# Patient Record
Sex: FEMALE | Race: WHITE | Employment: UNEMPLOYED | URBAN - METROPOLITAN AREA
[De-identification: names, ages, dates, MRNs, and addresses within clinical notes are randomized per-mention and may not be internally consistent; named-entity substitution may affect disease eponyms.]

---

## 2019-07-12 ENCOUNTER — APPOINTMENT (OUTPATIENT)
Dept: CT IMAGING | Age: 79
End: 2019-07-12
Payer: MEDICARE

## 2019-07-12 ENCOUNTER — HOSPITAL ENCOUNTER (EMERGENCY)
Age: 79
Discharge: HOME | End: 2019-07-12
Attending: EMERGENCY MEDICINE
Payer: MEDICARE

## 2019-07-12 VITALS
WEIGHT: 220 LBS | DIASTOLIC BLOOD PRESSURE: 58 MMHG | TEMPERATURE: 98.3 F | SYSTOLIC BLOOD PRESSURE: 138 MMHG | BODY MASS INDEX: 35.36 KG/M2 | HEART RATE: 63 BPM | RESPIRATION RATE: 16 BRPM | OXYGEN SATURATION: 100 % | HEIGHT: 66 IN

## 2019-07-12 DIAGNOSIS — R42 DIZZINESS: ICD-10-CM

## 2019-07-12 DIAGNOSIS — H81.10 BENIGN PAROXYSMAL POSITIONAL VERTIGO, UNSPECIFIED LATERALITY: Primary | ICD-10-CM

## 2019-07-12 LAB
A/G RATIO: 1.1 (ref 1.1–2.2)
ALBUMIN SERPL-MCNC: 4.1 G/DL (ref 3.4–5)
ALP BLD-CCNC: 135 U/L (ref 40–129)
ALT SERPL-CCNC: 13 U/L (ref 10–40)
ANION GAP SERPL CALCULATED.3IONS-SCNC: 9 MMOL/L (ref 3–16)
AST SERPL-CCNC: 26 U/L (ref 15–37)
BACTERIA: ABNORMAL /HPF
BASOPHILS ABSOLUTE: 0.1 K/UL (ref 0–0.2)
BASOPHILS RELATIVE PERCENT: 1 %
BILIRUB SERPL-MCNC: 0.6 MG/DL (ref 0–1)
BILIRUBIN URINE: NEGATIVE
BLOOD, URINE: NEGATIVE
BUN BLDV-MCNC: 17 MG/DL (ref 7–20)
CALCIUM SERPL-MCNC: 9.9 MG/DL (ref 8.3–10.6)
CHLORIDE BLD-SCNC: 103 MMOL/L (ref 99–110)
CLARITY: CLEAR
CO2: 26 MMOL/L (ref 21–32)
COLOR: YELLOW
CREAT SERPL-MCNC: 0.9 MG/DL (ref 0.6–1.2)
EKG ATRIAL RATE: 66 BPM
EKG DIAGNOSIS: NORMAL
EKG P AXIS: 11 DEGREES
EKG P-R INTERVAL: 150 MS
EKG Q-T INTERVAL: 442 MS
EKG QRS DURATION: 78 MS
EKG QTC CALCULATION (BAZETT): 463 MS
EKG R AXIS: -15 DEGREES
EKG T AXIS: 30 DEGREES
EKG VENTRICULAR RATE: 66 BPM
EOSINOPHILS ABSOLUTE: 0.1 K/UL (ref 0–0.6)
EOSINOPHILS RELATIVE PERCENT: 1.3 %
EPITHELIAL CELLS, UA: ABNORMAL /HPF
GFR AFRICAN AMERICAN: >60
GFR NON-AFRICAN AMERICAN: >60
GLOBULIN: 3.7 G/DL
GLUCOSE BLD-MCNC: 111 MG/DL (ref 70–99)
GLUCOSE URINE: NEGATIVE MG/DL
HCT VFR BLD CALC: 43.5 % (ref 36–48)
HEMOGLOBIN: 14.6 G/DL (ref 12–16)
KETONES, URINE: NEGATIVE MG/DL
LEUKOCYTE ESTERASE, URINE: ABNORMAL
LYMPHOCYTES ABSOLUTE: 2.4 K/UL (ref 1–5.1)
LYMPHOCYTES RELATIVE PERCENT: 35.1 %
MCH RBC QN AUTO: 30.5 PG (ref 26–34)
MCHC RBC AUTO-ENTMCNC: 33.6 G/DL (ref 31–36)
MCV RBC AUTO: 90.8 FL (ref 80–100)
MICROSCOPIC EXAMINATION: YES
MONOCYTES ABSOLUTE: 0.6 K/UL (ref 0–1.3)
MONOCYTES RELATIVE PERCENT: 9.2 %
NEUTROPHILS ABSOLUTE: 3.7 K/UL (ref 1.7–7.7)
NEUTROPHILS RELATIVE PERCENT: 53.4 %
NITRITE, URINE: NEGATIVE
PDW BLD-RTO: 13.5 % (ref 12.4–15.4)
PH UA: 5.5 (ref 5–8)
PLATELET # BLD: 276 K/UL (ref 135–450)
PMV BLD AUTO: 7.4 FL (ref 5–10.5)
POTASSIUM SERPL-SCNC: 5.2 MMOL/L (ref 3.5–5.1)
PROTEIN UA: NEGATIVE MG/DL
RBC # BLD: 4.78 M/UL (ref 4–5.2)
RBC UA: ABNORMAL /HPF (ref 0–2)
SODIUM BLD-SCNC: 138 MMOL/L (ref 136–145)
SPECIFIC GRAVITY UA: 1.02 (ref 1–1.03)
TOTAL PROTEIN: 7.8 G/DL (ref 6.4–8.2)
TROPONIN: <0.01 NG/ML
URINE TYPE: ABNORMAL
UROBILINOGEN, URINE: 0.2 E.U./DL
WBC # BLD: 6.9 K/UL (ref 4–11)
WBC UA: ABNORMAL /HPF (ref 0–5)

## 2019-07-12 PROCEDURE — 6360000002 HC RX W HCPCS: Performed by: EMERGENCY MEDICINE

## 2019-07-12 PROCEDURE — 96361 HYDRATE IV INFUSION ADD-ON: CPT

## 2019-07-12 PROCEDURE — 99284 EMERGENCY DEPT VISIT MOD MDM: CPT

## 2019-07-12 PROCEDURE — 96374 THER/PROPH/DIAG INJ IV PUSH: CPT

## 2019-07-12 PROCEDURE — 85025 COMPLETE CBC W/AUTO DIFF WBC: CPT

## 2019-07-12 PROCEDURE — 93010 ELECTROCARDIOGRAM REPORT: CPT | Performed by: INTERNAL MEDICINE

## 2019-07-12 PROCEDURE — 80053 COMPREHEN METABOLIC PANEL: CPT

## 2019-07-12 PROCEDURE — 70450 CT HEAD/BRAIN W/O DYE: CPT

## 2019-07-12 PROCEDURE — 2580000003 HC RX 258: Performed by: EMERGENCY MEDICINE

## 2019-07-12 PROCEDURE — 6370000000 HC RX 637 (ALT 250 FOR IP): Performed by: EMERGENCY MEDICINE

## 2019-07-12 PROCEDURE — 84484 ASSAY OF TROPONIN QUANT: CPT

## 2019-07-12 PROCEDURE — 81001 URINALYSIS AUTO W/SCOPE: CPT

## 2019-07-12 PROCEDURE — 93005 ELECTROCARDIOGRAM TRACING: CPT | Performed by: EMERGENCY MEDICINE

## 2019-07-12 RX ORDER — 0.9 % SODIUM CHLORIDE 0.9 %
500 INTRAVENOUS SOLUTION INTRAVENOUS ONCE
Status: COMPLETED | OUTPATIENT
Start: 2019-07-12 | End: 2019-07-12

## 2019-07-12 RX ORDER — ONDANSETRON 2 MG/ML
4 INJECTION INTRAMUSCULAR; INTRAVENOUS ONCE
Status: COMPLETED | OUTPATIENT
Start: 2019-07-12 | End: 2019-07-12

## 2019-07-12 RX ORDER — MECLIZINE HYDROCHLORIDE 25 MG/1
25 TABLET ORAL 3 TIMES DAILY PRN
Qty: 21 TABLET | Refills: 0 | Status: SHIPPED | OUTPATIENT
Start: 2019-07-12 | End: 2019-07-19

## 2019-07-12 RX ORDER — ONDANSETRON 4 MG/1
4 TABLET, ORALLY DISINTEGRATING ORAL EVERY 8 HOURS PRN
Qty: 15 TABLET | Refills: 0 | Status: SHIPPED | OUTPATIENT
Start: 2019-07-12 | End: 2019-07-17

## 2019-07-12 RX ORDER — MECLIZINE HCL 12.5 MG/1
25 TABLET ORAL ONCE
Status: COMPLETED | OUTPATIENT
Start: 2019-07-12 | End: 2019-07-12

## 2019-07-12 RX ORDER — CEPHALEXIN 500 MG/1
500 CAPSULE ORAL 2 TIMES DAILY
Qty: 14 CAPSULE | Refills: 0 | Status: SHIPPED | OUTPATIENT
Start: 2019-07-12 | End: 2019-07-19

## 2019-07-12 RX ORDER — CEPHALEXIN 250 MG/1
500 CAPSULE ORAL ONCE
Status: COMPLETED | OUTPATIENT
Start: 2019-07-12 | End: 2019-07-12

## 2019-07-12 RX ADMIN — CEPHALEXIN 500 MG: 250 CAPSULE ORAL at 18:29

## 2019-07-12 RX ADMIN — SODIUM CHLORIDE 500 ML: 9 INJECTION, SOLUTION INTRAVENOUS at 16:48

## 2019-07-12 RX ADMIN — MECLIZINE 25 MG: 12.5 TABLET ORAL at 16:48

## 2019-07-12 RX ADMIN — ONDANSETRON 4 MG: 2 INJECTION INTRAMUSCULAR; INTRAVENOUS at 16:48

## 2019-07-12 SDOH — HEALTH STABILITY: MENTAL HEALTH: HOW OFTEN DO YOU HAVE A DRINK CONTAINING ALCOHOL?: NEVER

## 2019-07-12 ASSESSMENT — ENCOUNTER SYMPTOMS
CHEST TIGHTNESS: 0
STRIDOR: 0
SHORTNESS OF BREATH: 0
ABDOMINAL PAIN: 0
NAUSEA: 1
SORE THROAT: 0
VOMITING: 1
TROUBLE SWALLOWING: 0
RHINORRHEA: 0
DIARRHEA: 0
WHEEZING: 0
COUGH: 0

## 2020-09-21 ENCOUNTER — APPOINTMENT (OUTPATIENT)
Dept: CT IMAGING | Age: 80
End: 2020-09-21
Payer: MEDICARE

## 2020-09-21 ENCOUNTER — HOSPITAL ENCOUNTER (EMERGENCY)
Age: 80
Discharge: HOME | End: 2020-09-21
Attending: STUDENT IN AN ORGANIZED HEALTH CARE EDUCATION/TRAINING PROGRAM
Payer: MEDICARE

## 2020-09-21 VITALS
OXYGEN SATURATION: 93 % | BODY MASS INDEX: 32.95 KG/M2 | SYSTOLIC BLOOD PRESSURE: 108 MMHG | HEART RATE: 78 BPM | WEIGHT: 205 LBS | RESPIRATION RATE: 16 BRPM | TEMPERATURE: 97.6 F | HEIGHT: 66 IN | DIASTOLIC BLOOD PRESSURE: 56 MMHG

## 2020-09-21 LAB
A/G RATIO: 1.4 (ref 1.1–2.2)
ALBUMIN SERPL-MCNC: 4.7 G/DL (ref 3.4–5)
ALP BLD-CCNC: 124 U/L (ref 40–129)
ALT SERPL-CCNC: 20 U/L (ref 10–40)
ANION GAP SERPL CALCULATED.3IONS-SCNC: 14 MMOL/L (ref 3–16)
AST SERPL-CCNC: 25 U/L (ref 15–37)
BASOPHILS ABSOLUTE: 0.1 K/UL (ref 0–0.2)
BASOPHILS RELATIVE PERCENT: 0.7 %
BILIRUB SERPL-MCNC: 0.8 MG/DL (ref 0–1)
BILIRUBIN URINE: NEGATIVE
BLOOD, URINE: NEGATIVE
BUN BLDV-MCNC: 19 MG/DL (ref 7–20)
C DIFF TOXIN/ANTIGEN: NORMAL
CALCIUM SERPL-MCNC: 10.2 MG/DL (ref 8.3–10.6)
CHLORIDE BLD-SCNC: 98 MMOL/L (ref 99–110)
CLARITY: CLEAR
CO2: 20 MMOL/L (ref 21–32)
COLOR: YELLOW
CREAT SERPL-MCNC: 0.9 MG/DL (ref 0.6–1.2)
EKG ATRIAL RATE: 75 BPM
EKG DIAGNOSIS: NORMAL
EKG P AXIS: 48 DEGREES
EKG P-R INTERVAL: 156 MS
EKG Q-T INTERVAL: 438 MS
EKG QRS DURATION: 82 MS
EKG QTC CALCULATION (BAZETT): 489 MS
EKG R AXIS: -15 DEGREES
EKG T AXIS: 56 DEGREES
EKG VENTRICULAR RATE: 75 BPM
EOSINOPHILS ABSOLUTE: 0 K/UL (ref 0–0.6)
EOSINOPHILS RELATIVE PERCENT: 0.2 %
GFR AFRICAN AMERICAN: >60
GFR NON-AFRICAN AMERICAN: >60
GLOBULIN: 3.3 G/DL
GLUCOSE BLD-MCNC: 216 MG/DL (ref 70–99)
GLUCOSE URINE: NEGATIVE MG/DL
HCT VFR BLD CALC: 47.1 % (ref 36–48)
HEMOGLOBIN: 16.4 G/DL (ref 12–16)
KETONES, URINE: NEGATIVE MG/DL
LACTIC ACID: 1.3 MMOL/L (ref 0.4–2)
LEUKOCYTE ESTERASE, URINE: NEGATIVE
LIPASE: 17 U/L (ref 13–60)
LYMPHOCYTES ABSOLUTE: 1.5 K/UL (ref 1–5.1)
LYMPHOCYTES RELATIVE PERCENT: 9.4 %
MAGNESIUM: 1.2 MG/DL (ref 1.8–2.4)
MCH RBC QN AUTO: 30.7 PG (ref 26–34)
MCHC RBC AUTO-ENTMCNC: 34.8 G/DL (ref 31–36)
MCV RBC AUTO: 88.1 FL (ref 80–100)
MICROSCOPIC EXAMINATION: NORMAL
MONOCYTES ABSOLUTE: 0.5 K/UL (ref 0–1.3)
MONOCYTES RELATIVE PERCENT: 3.3 %
NEUTROPHILS ABSOLUTE: 13.8 K/UL (ref 1.7–7.7)
NEUTROPHILS RELATIVE PERCENT: 86.4 %
NITRITE, URINE: NEGATIVE
PDW BLD-RTO: 13.6 % (ref 12.4–15.4)
PH UA: 6.5 (ref 5–8)
PLATELET # BLD: 335 K/UL (ref 135–450)
PMV BLD AUTO: 8 FL (ref 5–10.5)
POTASSIUM REFLEX MAGNESIUM: 3.1 MMOL/L (ref 3.5–5.1)
PROTEIN UA: NEGATIVE MG/DL
RBC # BLD: 5.35 M/UL (ref 4–5.2)
SODIUM BLD-SCNC: 132 MMOL/L (ref 136–145)
SPECIFIC GRAVITY UA: <=1.005 (ref 1–1.03)
TOTAL PROTEIN: 8 G/DL (ref 6.4–8.2)
TROPONIN: <0.01 NG/ML
URINE TYPE: NORMAL
UROBILINOGEN, URINE: 1 E.U./DL
WBC # BLD: 16 K/UL (ref 4–11)

## 2020-09-21 PROCEDURE — 83605 ASSAY OF LACTIC ACID: CPT

## 2020-09-21 PROCEDURE — 85025 COMPLETE CBC W/AUTO DIFF WBC: CPT

## 2020-09-21 PROCEDURE — 87449 NOS EACH ORGANISM AG IA: CPT

## 2020-09-21 PROCEDURE — 96365 THER/PROPH/DIAG IV INF INIT: CPT

## 2020-09-21 PROCEDURE — 74177 CT ABD & PELVIS W/CONTRAST: CPT

## 2020-09-21 PROCEDURE — 81003 URINALYSIS AUTO W/O SCOPE: CPT

## 2020-09-21 PROCEDURE — 80053 COMPREHEN METABOLIC PANEL: CPT

## 2020-09-21 PROCEDURE — 87506 IADNA-DNA/RNA PROBE TQ 6-11: CPT

## 2020-09-21 PROCEDURE — 6360000004 HC RX CONTRAST MEDICATION: Performed by: STUDENT IN AN ORGANIZED HEALTH CARE EDUCATION/TRAINING PROGRAM

## 2020-09-21 PROCEDURE — 6360000002 HC RX W HCPCS

## 2020-09-21 PROCEDURE — 84484 ASSAY OF TROPONIN QUANT: CPT

## 2020-09-21 PROCEDURE — 93010 ELECTROCARDIOGRAM REPORT: CPT | Performed by: INTERNAL MEDICINE

## 2020-09-21 PROCEDURE — 83690 ASSAY OF LIPASE: CPT

## 2020-09-21 PROCEDURE — 6370000000 HC RX 637 (ALT 250 FOR IP): Performed by: STUDENT IN AN ORGANIZED HEALTH CARE EDUCATION/TRAINING PROGRAM

## 2020-09-21 PROCEDURE — 2580000003 HC RX 258: Performed by: STUDENT IN AN ORGANIZED HEALTH CARE EDUCATION/TRAINING PROGRAM

## 2020-09-21 PROCEDURE — 87324 CLOSTRIDIUM AG IA: CPT

## 2020-09-21 PROCEDURE — 93005 ELECTROCARDIOGRAM TRACING: CPT | Performed by: STUDENT IN AN ORGANIZED HEALTH CARE EDUCATION/TRAINING PROGRAM

## 2020-09-21 PROCEDURE — 96372 THER/PROPH/DIAG INJ SC/IM: CPT

## 2020-09-21 PROCEDURE — 6360000002 HC RX W HCPCS: Performed by: STUDENT IN AN ORGANIZED HEALTH CARE EDUCATION/TRAINING PROGRAM

## 2020-09-21 PROCEDURE — 96375 TX/PRO/DX INJ NEW DRUG ADDON: CPT

## 2020-09-21 PROCEDURE — 83735 ASSAY OF MAGNESIUM: CPT

## 2020-09-21 PROCEDURE — 99284 EMERGENCY DEPT VISIT MOD MDM: CPT

## 2020-09-21 RX ORDER — 0.9 % SODIUM CHLORIDE 0.9 %
500 INTRAVENOUS SOLUTION INTRAVENOUS ONCE
Status: COMPLETED | OUTPATIENT
Start: 2020-09-21 | End: 2020-09-21

## 2020-09-21 RX ORDER — 0.9 % SODIUM CHLORIDE 0.9 %
1000 INTRAVENOUS SOLUTION INTRAVENOUS ONCE
Status: DISCONTINUED | OUTPATIENT
Start: 2020-09-21 | End: 2020-09-21 | Stop reason: HOSPADM

## 2020-09-21 RX ORDER — MAGNESIUM SULFATE 1 G/100ML
1 INJECTION INTRAVENOUS ONCE
Status: COMPLETED | OUTPATIENT
Start: 2020-09-21 | End: 2020-09-21

## 2020-09-21 RX ORDER — ONDANSETRON 2 MG/ML
4 INJECTION INTRAMUSCULAR; INTRAVENOUS EVERY 6 HOURS PRN
Status: DISCONTINUED | OUTPATIENT
Start: 2020-09-21 | End: 2020-09-21 | Stop reason: HOSPADM

## 2020-09-21 RX ORDER — KETOROLAC TROMETHAMINE 30 MG/ML
15 INJECTION, SOLUTION INTRAMUSCULAR; INTRAVENOUS ONCE
Status: COMPLETED | OUTPATIENT
Start: 2020-09-21 | End: 2020-09-21

## 2020-09-21 RX ORDER — DICYCLOMINE HYDROCHLORIDE 10 MG/ML
INJECTION INTRAMUSCULAR
Status: COMPLETED
Start: 2020-09-21 | End: 2020-09-21

## 2020-09-21 RX ORDER — DICYCLOMINE HCL 20 MG
20 TABLET ORAL 3 TIMES DAILY PRN
Qty: 120 TABLET | Refills: 3 | Status: SHIPPED | OUTPATIENT
Start: 2020-09-21 | End: 2021-12-16 | Stop reason: ALTCHOICE

## 2020-09-21 RX ORDER — DICYCLOMINE HYDROCHLORIDE 10 MG/ML
20 INJECTION INTRAMUSCULAR ONCE
Status: COMPLETED | OUTPATIENT
Start: 2020-09-21 | End: 2020-09-21

## 2020-09-21 RX ORDER — METRONIDAZOLE 500 MG/1
500 TABLET ORAL 3 TIMES DAILY
Qty: 21 TABLET | Refills: 0 | Status: SHIPPED | OUTPATIENT
Start: 2020-09-21 | End: 2020-09-28

## 2020-09-21 RX ORDER — POTASSIUM CHLORIDE 20 MEQ/1
40 TABLET, EXTENDED RELEASE ORAL ONCE
Status: COMPLETED | OUTPATIENT
Start: 2020-09-21 | End: 2020-09-21

## 2020-09-21 RX ORDER — ONDANSETRON 4 MG/1
4 TABLET, ORALLY DISINTEGRATING ORAL 3 TIMES DAILY PRN
Qty: 21 TABLET | Refills: 0 | Status: SHIPPED | OUTPATIENT
Start: 2020-09-21 | End: 2021-12-16 | Stop reason: ALTCHOICE

## 2020-09-21 RX ORDER — CIPROFLOXACIN 500 MG/1
500 TABLET, FILM COATED ORAL 2 TIMES DAILY
Qty: 20 TABLET | Refills: 0 | Status: SHIPPED | OUTPATIENT
Start: 2020-09-21 | End: 2020-10-01

## 2020-09-21 RX ADMIN — DICYCLOMINE HYDROCHLORIDE 20 MG: 10 INJECTION INTRAMUSCULAR at 05:12

## 2020-09-21 RX ADMIN — SODIUM CHLORIDE 500 ML: 9 INJECTION, SOLUTION INTRAVENOUS at 05:04

## 2020-09-21 RX ADMIN — DICYCLOMINE HYDROCHLORIDE 20 MG: 20 INJECTION, SOLUTION INTRAMUSCULAR at 05:12

## 2020-09-21 RX ADMIN — POTASSIUM CHLORIDE 40 MEQ: 1500 TABLET, EXTENDED RELEASE ORAL at 06:03

## 2020-09-21 RX ADMIN — ONDANSETRON HYDROCHLORIDE 4 MG: 2 INJECTION, SOLUTION INTRAMUSCULAR; INTRAVENOUS at 05:03

## 2020-09-21 RX ADMIN — MAGNESIUM SULFATE HEPTAHYDRATE 1 G: 1 INJECTION, SOLUTION INTRAVENOUS at 06:03

## 2020-09-21 RX ADMIN — KETOROLAC TROMETHAMINE 15 MG: 30 INJECTION, SOLUTION INTRAMUSCULAR at 05:03

## 2020-09-21 RX ADMIN — IOPAMIDOL 75 ML: 755 INJECTION, SOLUTION INTRAVENOUS at 05:39

## 2020-09-21 ASSESSMENT — PAIN SCALES - GENERAL
PAINLEVEL_OUTOF10: 7
PAINLEVEL_OUTOF10: 7

## 2020-09-21 ASSESSMENT — PAIN DESCRIPTION - LOCATION: LOCATION: ABDOMEN

## 2020-09-21 NOTE — ED NOTES
Urine specimen obtain via quick cath using sterile technique. Patient positioned Semi fowlers, call light in reach, and patient resting comfortably. Patient's son at bedside.        Frances Roe  09/21/20 [id number]

## 2020-09-21 NOTE — ED PROVIDER NOTES
[address] ED      CHIEF COMPLAINT  Illness (pt states didn't feel good today then starting around midnight with vomiting and diarrhea and stomach cramping.)       HISTORY OF PRESENT ILLNESS  Royce Wagner is a 70 y.o. female with a past medical history of atrial fibrillation and cirrhosis who presents to the ED complaining of vomiting, diarrhea, and stomach cramping. Patient felt unwell yesterday, reports nausea. Began having vomiting and diarrhea around midnight. Many episodes of NBNB emesis. Nonbloody watery diarrhea. Generalized cramping abdominal pain. Constant since midnight. No palliative or provocative factors. Did not take anything for symptoms. Denies previous similar episodes. Denies fever, dysuria, vaginal bleeding or discharge    Reports mild cough since this afternoon. Reports headache (generalized, aching pain, constant, not worst headache of life- denies vision changes, trauma, numbness, weakness, tingling) and lightheadedness- no fall or LOC. Denies recent travel. Denies exotic food. Did have COVID positive contact but this was 3 weeks ago. She has not had any symptoms prior to today. .  Patient denies recent antibiotics. She is not on blood thinners s/p ablation 7y ago. Occasional A fib but rare. No other complaints, modifying factors or associated symptoms. I have reviewed the following from the nursing documentation. Past Medical History:   Diagnosis Date    Atrial fibrillation ([address]     Depression      Past Surgical History:   Procedure Laterality Date    ABLATION OF DYSRHYTHMIC FOCUS      HYSTERECTOMY      JOINT REPLACEMENT      right knee     History reviewed. No pertinent family history. Social History     Socioeconomic History    Marital status:       Spouse name: Not on file    Number of children: Not on file    Years of education: Not on file    Highest education level: Not on file   Occupational History    Not on file   Social Needs    Financial resource strain: Not on file    Food insecurity     Worry: Not on file     Inability: Not on file    Transportation needs     Medical: Not on file     Non-medical: Not on file   Tobacco Use    Smoking status: Never Smoker    Smokeless tobacco: Never Used   Substance and Sexual Activity    Alcohol use: Never     Frequency: Never    Drug use: Never    Sexual activity: Not on file   Lifestyle    Physical activity     Days per week: Not on file     Minutes per session: Not on file    Stress: Not on file   Relationships    Social connections     Talks on phone: Not on file     Gets together: Not on file     Attends [employer] service: Not on file     Active member of club or organization: Not on file     Attends meetings of clubs or organizations: Not on file     Relationship status: Not on file    Intimate partner violence     Fear of current or ex partner: Not on file     Emotionally abused: Not on file     Physically abused: Not on file     Forced sexual activity: Not on file   Other Topics Concern    Not on file   Social History Narrative    Not on file     No current facility-administered medications for this encounter. Current Outpatient Medications   Medication Sig Dispense Refill    Vitamin D (CHOLECALCIFEROL) 1000 UNITS CAPS capsule Take 1,000 Units by mouth 2 times daily (with meals).  ursodiol (ACTIGALL) 300 MG capsule Take 250 mg by mouth 3 times daily (before meals). Allergies   Allergen Reactions    Quinidex [Quinidine] Other (See Comments)     Reaction unknown to pt. REVIEW OF SYSTEMS  10 systems reviewed, pertinent positives per HPI otherwise noted to be negative. PHYSICAL EXAM  BP (!) 158/114   Pulse 75   Temp 97.6 °F (36.4 °C) (Oral)   Resp 18   Ht 5' 6\" (1.676 m)   Wt 205 lb (93 kg)   SpO2 95%   BMI 33.09 kg/m²    GENERAL APPEARANCE: Awake and alert. Cooperative. In moderate distress. HENT: Normocephalic. Atraumatic.  Mucous membranes are moist  NECK: Supple. Full range of motion of the neck without stiffness or pain. EYES: PERRL. EOM's grossly intact. HEART/CHEST: RRR. No murmurs. LUNGS: Respirations unlabored. CTAB. Good air exchange. Speaking comfortably in full sentences. ABDOMEN: Generalized tenderness with guarding. Soft. Non-distended. No masses. No organomegaly. No rebound. MUSCULOSKELETAL: No extremity edema. Compartments soft. No deformity. No tenderness in the extremities. All extremities neurovascularly intact. SKIN: Warm and dry. No acute rashes. NEUROLOGICAL: Alert and oriented. CN's 2-12 intact. No gross facial drooping. Strength 5/5, sensation intact. PSYCHIATRIC: Normal mood and affect. LABS  I have reviewed all labs for this visit.    Results for orders placed or performed during the hospital encounter of 09/21/20   CBC Auto Differential   Result Value Ref Range    WBC 16.0 (H) 4.0 - 11.0 K/uL    RBC 5.35 (H) 4.00 - 5.20 M/uL    Hemoglobin 16.4 (H) 12.0 - 16.0 g/dL    Hematocrit 47.1 36.0 - 48.0 %    MCV 88.1 80.0 - 100.0 fL    MCH 30.7 26.0 - 34.0 pg    MCHC 34.8 31.0 - 36.0 g/dL    RDW 13.6 12.4 - 15.4 %    Platelets 609 584 - 341 K/uL    MPV 8.0 5.0 - 10.5 fL    Neutrophils % 86.4 %    Lymphocytes % 9.4 %    Monocytes % 3.3 %    Eosinophils % 0.2 %    Basophils % 0.7 %    Neutrophils Absolute 13.8 (H) 1.7 - 7.7 K/uL    Lymphocytes Absolute 1.5 1.0 - 5.1 K/uL    Monocytes Absolute 0.5 0.0 - 1.3 K/uL    Eosinophils Absolute 0.0 0.0 - 0.6 K/uL    Basophils Absolute 0.1 0.0 - 0.2 K/uL   Comprehensive Metabolic Panel w/ Reflex to MG   Result Value Ref Range    Sodium 132 (L) 136 - 145 mmol/L    Potassium reflex Magnesium 3.1 (L) 3.5 - 5.1 mmol/L    Chloride 98 (L) 99 - 110 mmol/L    CO2 20 (L) 21 - 32 mmol/L    Anion Gap 14 3 - 16    Glucose 216 (H) 70 - 99 mg/dL    BUN 19 7 - 20 mg/dL    CREATININE 0.9 0.6 - 1.2 mg/dL    GFR Non-African American >60 >60    GFR African American >60 >60    Calcium 10.2 8.3 - 10.6 mg/dL    Total Protein 8.0 6.4 - 8.2 g/dL    Alb 4.7 3.4 - 5.0 g/dL    Albumin/Globulin Ratio 1.4 1.1 - 2.2    Total Bilirubin 0.8 0.0 - 1.0 mg/dL    Alkaline Phosphatase 124 40 - 129 U/L    ALT 20 10 - 40 U/L    AST 25 15 - 37 U/L    Globulin 3.3 g/dL   Lipase   Result Value Ref Range    Lipase 17.0 13.0 - 60.0 U/L   Troponin   Result Value Ref Range    Troponin <0.01 <0.01 ng/mL   Lactic Acid, Plasma   Result Value Ref Range    Lactic Acid 1.3 0.4 - 2.0 mmol/L   Urinalysis, reflex to microscopic   Result Value Ref Range    Color, UA Yellow Straw/Yellow    Clarity, UA Clear Clear    Glucose, Ur Negative Negative mg/dL    Bilirubin Urine Negative Negative    Ketones, Urine Negative Negative mg/dL    Specific Gravity, UA <=1.005 1.005 - 1.030    Blood, Urine Negative Negative    pH, UA 6.5 5.0 - 8.0    Protein, UA Negative Negative mg/dL    Urobilinogen, Urine 1.0 <2.0 E.U./dL    Nitrite, Urine Negative Negative    Leukocyte Esterase, Urine Negative Negative    Microscopic Examination Not Indicated     Urine Type NotGiven    Magnesium   Result Value Ref Range    Magnesium 1.20 (L) 1.80 - 2.40 mg/dL       ECG  The Ekg interpreted by me shows  sinus arrhythmia with a rate of 75, occasional PVCs  Axis is   Left axis deviation  QTc is  within an acceptable range  Intervals and Durations are unremarkable. ST Segments: no acute change  No significant change from prior EKG dated 7/12/19    RADIOLOGY    CT ABDOMEN PELVIS W IV CONTRAST Additional Contrast? None   Final Result   Fluid-filled nondilated small and large bowel loops with wall   hyperenhancement. Findings consistent with enterocolitis. No evidence of   obstruction. Trace ascites. Bilateral nonobstructing nephrolithiasis.               During the patient's ED course, the patient was given:  Medications   ondansetron (ZOFRAN) injection 4 mg (4 mg Intravenous Given 9/21/20 4373)   magnesium sulfate 1 g in dextrose 5% 100 mL IVPB (1 g Intravenous New Bag 9/21/20 0603)   0.9 % sodium chloride bolus (0 mLs Intravenous Stopped 9/21/20 0602)   ketorolac (TORADOL) injection 15 mg (15 mg Intravenous Given 9/21/20 0503)   dicyclomine (BENTYL) injection 20 mg (20 mg Intramuscular Given 9/21/20 0512)   iopamidol (ISOVUE-370) 76 % injection 75 mL (75 mLs Intravenous Given 9/21/20 0539)   potassium chloride (KLOR-CON M) extended release tablet 40 mEq (40 mEq Oral Given 9/21/20 1405)        ED COURSE/MDM  Patient seen and evaluated. Old records reviewed. Labs and imaging reviewed and results discussed with patient. Overall, uncomfortable appearing patient in moderate distress, presenting for generalized cramping abdominal pain, vomiting, diarrhea. Physical exam remarkable for generalized abdominal pain with guarding. Differential diagnosis includes but is not limited to: Colitis, gastroenteritis, infectious diarrhea, low suspicion for appendicitis, bowel obstruction, peptic ulcer disease, cholecystitis, diverticulitis, hernia, pancreatitis, hepatitis or other liver disease, UTI, coronavirus, ldoubt AAA and atypical ACS    Laboratory studies obtained. Based on guarding on abdominal exam the acute onset of symptoms patient's age I do feel that CT scan of the abdomen and pelvis is warranted at this time. ED Course as of Sep 21 0747   Mon Sep 21, 2020   0619 Patient has leukocytosis. No anemia or thrombocytopenia. [ER]   2955 Patient has hyponatremia, hypochloremia, and hypokalemia. Also hypomagnesemia. Will replete.      [ER]   [id number] Patient has hyperglycemia. No anion gap. Patient does not have a history of diabetes. [ER]   [id number] Liver function testing unremarkable. [ER]   0535 Lactate within normal limits. [ER]   0535 Lipase within normal limits. Low suspicion for pancreatitis. [ER]   0535 Troponin within normal limits. EKG without evidence of ischemia. Low suspicion for atypical ACS.     [ER]   [id number] Urinalysis shows no evidence of infection or blood. Low suspicion for UTI or kidney stone.    [ER]   0754 Will discuss with GI whether or not antibiotics are indicated for enterocolitis. [ER]   [id number] Stool studies sent. [ER]   4457 C. difficile testing negative. [ER]      ED Course User Index  [ER] Jamal Ludwig MD     CT ABDOMEN PELVIS W IV CONTRAST Additional Contrast? None   Final Result   Fluid-filled nondilated small and large bowel loops with wall   hyperenhancement. Findings consistent with enterocolitis. No evidence of   obstruction. Trace ascites. Bilateral nonobstructing nephrolithiasis. Patient reports symptomatic improvement after antiemetics and pain control. Labs remarkable for leukocytosis and mild electrolyte abnormalities. Electrolytes repleted. CT scan showed enterocolitis. Did speak to gastroenterology who recommended Ciprofloxacin and Flagyl antibiotics. They recommended follow-up in their clinic. Liver function testing unremarkable. Low suspicion for hepatitis or other acute liver pathology. Lipase within normal limits. Low suspicion for pancreatitis. Urinalysis showed no evidence of blood or infection. Low suspicion for UTI or kidney stone. Patient significant symptoms are the vomiting, abdominal pain, and diarrhea. She does report headache and occasional cough but these are minor symptoms. Low suspicion for coronavirus. Patient to be discharged home. Strict return precautions given, including inability to tolerate her oral medications. Patient given prescriptions for Zofran, Bentyl, ciprofloxacin, and Flagyl. Patient encouraged take a multivitamin. Encouraged close PCP follow-up. Patient given referral for GI follow-up. Patient discharged in stable condition. CLINICAL IMPRESSION  1. Generalized abdominal pain    2. Non-intractable vomiting with nausea, unspecified vomiting type    3. Diarrhea of presumed infectious origin    4.  Electrolyte abnormality        Blood pressure 126/71, pulse 88, temperature 97.6 °F (36.4 °C), temperature source Oral, resp. rate 16, height 5' 6\" (1.676 m), weight 205 lb (93 kg), SpO2 93 %. [address] was discharged to home in stable condition. Patient was given scripts for the following medications. I counseled patient how to take these medications. New Prescriptions    CIPROFLOXACIN (CIPRO) 500 MG TABLET    Take 1 tablet by mouth 2 times daily for 10 days    DICYCLOMINE (BENTYL) 20 MG TABLET    Take 1 tablet by mouth 3 times daily as needed (abdominal spasms)    METRONIDAZOLE (FLAGYL) 500 MG TABLET    Take 1 tablet by mouth 3 times daily for 7 days    ONDANSETRON (ZOFRAN-ODT) 4 MG DISINTEGRATING TABLET    Take 1 tablet by mouth 3 times daily as needed for Nausea or Vomiting       Follow-up with:  Benji Ardon MD  [address]  [place], [facility]  Phone: [phone]  Fax: [phone]        Khadijah Nunez MD  [address] [place] [address] [facility] ED  [address] [facility]  [phone]  Go to   As needed, If symptoms worsen      DISCLAIMER: This chart was created using Dragon dictation software. Efforts were made by me to ensure accuracy, however some errors may be present due to limitations of this technology and occasionally words are not transcribed correctly. Bria Ferro MD  09/21/20 9459    Addendum: I estimate there is LOW risk for ACUTE APPENDICITIS, BOWEL OBSTRUCTION, ACUTE CHOLECYSTITIS, RUPTURED DIVERTICULITIS, INCARCERATED or STRANGULATED HERNIA, HEMMORHAGIC PANCREATITIS, PERFORATED BOWEL/ULCER, ECTOPIC PREGNANCY, OVARIAN TORSION or TUBO-OVARIAN ABSCESS thus I consider the discharge disposition reasonable. Ms Tita Taylor ([facility]) and I have discussed the diagnosis and risks, and we agree with discharging home with close follow-up.  We also discussed returning to the Emergency Department immediately if new or worsening symptoms occur. We have discussed the symptoms which are most concerning that necessitate immediate return.          Ansley Mohr MD  09/21/20 2089

## 2020-09-24 LAB
CAMPYLOBACTER JEJUNI/COLI PCR: NOT DETECTED
CAMPYLOBACTER UPSALIENSIS: NOT DETECTED
E COLI SHIGELLA/ENTEROINVASIVE PCR: NOT DETECTED
SALMONELLA PCR: NOT DETECTED
SHIGA TOXIN I: NOT DETECTED
SHIGA TOXIN II: NOT DETECTED

## 2020-11-24 ENCOUNTER — APPOINTMENT (OUTPATIENT)
Dept: GENERAL RADIOLOGY | Age: 80
End: 2020-11-24
Payer: MEDICARE

## 2020-11-24 ENCOUNTER — HOSPITAL ENCOUNTER (EMERGENCY)
Age: 80
Discharge: HOME | End: 2020-11-25
Attending: EMERGENCY MEDICINE
Payer: MEDICARE

## 2020-11-24 VITALS
WEIGHT: 200 LBS | HEART RATE: 65 BPM | HEIGHT: 66 IN | BODY MASS INDEX: 32.14 KG/M2 | SYSTOLIC BLOOD PRESSURE: 169 MMHG | DIASTOLIC BLOOD PRESSURE: 75 MMHG | RESPIRATION RATE: 16 BRPM | TEMPERATURE: 97.4 F | OXYGEN SATURATION: 98 %

## 2020-11-24 LAB
A/G RATIO: 1.2 (ref 1.1–2.2)
ALBUMIN SERPL-MCNC: 4.1 G/DL (ref 3.4–5)
ALP BLD-CCNC: 120 U/L (ref 40–129)
ALT SERPL-CCNC: 11 U/L (ref 10–40)
ANION GAP SERPL CALCULATED.3IONS-SCNC: 11 MMOL/L (ref 3–16)
AST SERPL-CCNC: 19 U/L (ref 15–37)
BASOPHILS ABSOLUTE: 0.1 K/UL (ref 0–0.2)
BASOPHILS RELATIVE PERCENT: 1 %
BILIRUB SERPL-MCNC: 0.7 MG/DL (ref 0–1)
BUN BLDV-MCNC: 11 MG/DL (ref 7–20)
CALCIUM SERPL-MCNC: 9.2 MG/DL (ref 8.3–10.6)
CHLORIDE BLD-SCNC: 100 MMOL/L (ref 99–110)
CO2: 27 MMOL/L (ref 21–32)
CREAT SERPL-MCNC: 0.8 MG/DL (ref 0.6–1.2)
EOSINOPHILS ABSOLUTE: 0.1 K/UL (ref 0–0.6)
EOSINOPHILS RELATIVE PERCENT: 1 %
GFR AFRICAN AMERICAN: >60
GFR NON-AFRICAN AMERICAN: >60
GLOBULIN: 3.3 G/DL
GLUCOSE BLD-MCNC: 117 MG/DL (ref 70–99)
HCT VFR BLD CALC: 41.6 % (ref 36–48)
HEMOGLOBIN: 14.4 G/DL (ref 12–16)
LYMPHOCYTES ABSOLUTE: 2.2 K/UL (ref 1–5.1)
LYMPHOCYTES RELATIVE PERCENT: 25.3 %
MCH RBC QN AUTO: 30.4 PG (ref 26–34)
MCHC RBC AUTO-ENTMCNC: 34.6 G/DL (ref 31–36)
MCV RBC AUTO: 87.9 FL (ref 80–100)
MONOCYTES ABSOLUTE: 0.7 K/UL (ref 0–1.3)
MONOCYTES RELATIVE PERCENT: 8.2 %
NEUTROPHILS ABSOLUTE: 5.6 K/UL (ref 1.7–7.7)
NEUTROPHILS RELATIVE PERCENT: 64.5 %
PDW BLD-RTO: 13.7 % (ref 12.4–15.4)
PLATELET # BLD: 240 K/UL (ref 135–450)
PMV BLD AUTO: 7.3 FL (ref 5–10.5)
POTASSIUM SERPL-SCNC: 2.8 MMOL/L (ref 3.5–5.1)
PRO-BNP: 319 PG/ML (ref 0–449)
RBC # BLD: 4.73 M/UL (ref 4–5.2)
SODIUM BLD-SCNC: 138 MMOL/L (ref 136–145)
TOTAL PROTEIN: 7.4 G/DL (ref 6.4–8.2)
TROPONIN: <0.01 NG/ML
WBC # BLD: 8.7 K/UL (ref 4–11)

## 2020-11-24 PROCEDURE — 80053 COMPREHEN METABOLIC PANEL: CPT

## 2020-11-24 PROCEDURE — 6370000000 HC RX 637 (ALT 250 FOR IP): Performed by: EMERGENCY MEDICINE

## 2020-11-24 PROCEDURE — 99284 EMERGENCY DEPT VISIT MOD MDM: CPT

## 2020-11-24 PROCEDURE — 71046 X-RAY EXAM CHEST 2 VIEWS: CPT

## 2020-11-24 PROCEDURE — 85025 COMPLETE CBC W/AUTO DIFF WBC: CPT

## 2020-11-24 PROCEDURE — 93005 ELECTROCARDIOGRAM TRACING: CPT | Performed by: EMERGENCY MEDICINE

## 2020-11-24 PROCEDURE — U0003 INFECTIOUS AGENT DETECTION BY NUCLEIC ACID (DNA OR RNA); SEVERE ACUTE RESPIRATORY SYNDROME CORONAVIRUS 2 (SARS-COV-2) (CORONAVIRUS DISEASE [COVID-19]), AMPLIFIED PROBE TECHNIQUE, MAKING USE OF HIGH THROUGHPUT TECHNOLOGIES AS DESCRIBED BY CMS-2020-01-R: HCPCS

## 2020-11-24 PROCEDURE — 83880 ASSAY OF NATRIURETIC PEPTIDE: CPT

## 2020-11-24 PROCEDURE — 84484 ASSAY OF TROPONIN QUANT: CPT

## 2020-11-24 RX ORDER — POTASSIUM CHLORIDE 750 MG/1
10 TABLET, EXTENDED RELEASE ORAL DAILY
Qty: 7 TABLET | Refills: 0 | Status: ON HOLD | OUTPATIENT
Start: 2020-11-24 | End: 2021-03-26 | Stop reason: CLARIF

## 2020-11-24 RX ORDER — POTASSIUM CHLORIDE 20 MEQ/1
60 TABLET, EXTENDED RELEASE ORAL ONCE
Status: COMPLETED | OUTPATIENT
Start: 2020-11-24 | End: 2020-11-24

## 2020-11-24 RX ADMIN — POTASSIUM CHLORIDE 60 MEQ: 1500 TABLET, EXTENDED RELEASE ORAL at 23:07

## 2020-11-24 ASSESSMENT — PAIN DESCRIPTION - DESCRIPTORS: DESCRIPTORS: ACHING;SORE

## 2020-11-24 ASSESSMENT — PAIN DESCRIPTION - FREQUENCY: FREQUENCY: CONTINUOUS

## 2020-11-24 ASSESSMENT — PAIN DESCRIPTION - ORIENTATION: ORIENTATION: MID

## 2020-11-24 ASSESSMENT — PAIN DESCRIPTION - LOCATION: LOCATION: CHEST

## 2020-11-24 ASSESSMENT — PAIN DESCRIPTION - PAIN TYPE: TYPE: ACUTE PAIN

## 2020-11-25 LAB
EKG ATRIAL RATE: 64 BPM
EKG DIAGNOSIS: NORMAL
EKG P AXIS: 9 DEGREES
EKG P-R INTERVAL: 170 MS
EKG Q-T INTERVAL: 424 MS
EKG QRS DURATION: 82 MS
EKG QTC CALCULATION (BAZETT): 437 MS
EKG R AXIS: -18 DEGREES
EKG T AXIS: 20 DEGREES
EKG VENTRICULAR RATE: 64 BPM

## 2020-11-25 PROCEDURE — 93010 ELECTROCARDIOGRAM REPORT: CPT | Performed by: INTERNAL MEDICINE

## 2020-11-25 NOTE — ED PROVIDER NOTES
I did not perform a face-to-face evaluation on this patient. The Ekg interpreted by me shows  normal sinus rhythm with a rate of 64  Axis is   Left axis deviation  QTc is  within an acceptable range  Intervals and Durations are unremarkable.       ST Segments: no acute change  No significant change from prior EKG dated 9/21/20           Beth Edmondson DO  11/25/20 8375

## 2020-11-25 NOTE — ED PROVIDER NOTES
Emergency Department Provider Note  Location: [place] [address] ED  11/24/2020     Patient Identification  Migue Chairez is a 68 y.o. female    Chief Complaint  Chest Pain (rapid heart this am but could not come to hospital now with soreness and pain )          HPI  (History provided by patient)  Patient is a 74-year-old female with history of atrial fibrillation status post ablation who presents for evaluation after episode of rapid heart rate earlier this morning at 5 AM lasting for an hour and then resolved. Since then she has had \"soreness across my chest\". There is no exacerbating or alleviating factors. There is no exertional quality or pleuritic quality. Is not associated with any nausea diaphoresis or other autonomic symptoms. The symptom of soreness has been mild and persistent and constant since it began early this morning. Patient does describe a nonproductive cough. No fevers but possibly chills. No known exposures to COVID-19 or sick contacts. I have reviewed the following nursing documentation:  Allergies: Allergies   Allergen Reactions    Quinidex [Quinidine] Other (See Comments)     Reaction unknown to pt. Past medical history:  has a past medical history of Atrial fibrillation ([address] and Depression. Past surgical history:  has a past surgical history that includes Hysterectomy; joint replacement; and ablation of dysrhythmic focus. Home medications:   Prior to Admission medications    Medication Sig Start Date End Date Taking?  Authorizing Provider   potassium chloride (KLOR-CON M) 10 MEQ extended release tablet Take 1 tablet by mouth daily for 7 days 11/24/20 12/1/20 Yes Ever Peraza MD   ondansetron (ZOFRAN-ODT) 4 MG disintegrating tablet Take 1 tablet by mouth 3 times daily as needed for Nausea or Vomiting 9/21/20  Yes Frank Kaur MD   dicyclomine (BENTYL) 20 MG tablet Take 1 tablet by mouth 3 times daily as needed (abdominal spasms) 9/21/20  Yes Corrie Shepherd [facility], MD   Vitamin D (CHOLECALCIFEROL) 1000 UNITS CAPS capsule Take 1,000 Units by mouth 2 times daily (with meals). Yes Historical Provider, MD   ursodiol (ACTIGALL) 300 MG capsule Take 250 mg by mouth 3 times daily (before meals). Yes Historical Provider, MD       Social history:  reports that she has never smoked. She has never used smokeless tobacco. She reports that she does not drink alcohol or use drugs. Family history:  History reviewed. No pertinent family history. ROS  Review of Systems   Constitutional: Positive for chills. Negative for fever. HENT: Negative for congestion and rhinorrhea. Eyes: Negative for photophobia and visual disturbance. Respiratory: Positive for cough and chest tightness. Negative for shortness of breath and wheezing. Cardiovascular: Positive for chest pain. Negative for palpitations. Gastrointestinal: Negative for abdominal distention, diarrhea, nausea and vomiting. Genitourinary: Negative for dysuria and hematuria. Musculoskeletal: Negative for back pain and neck pain. Skin: Negative for rash and wound. Neurological: Negative for syncope and weakness. Psychiatric/Behavioral: Negative for agitation and confusion. Exam  ED Triage Vitals [11/24/20 1830]   BP Temp Temp Source Pulse Resp SpO2 Height Weight   (!) 176/87 97.4 °F (36.3 °C) Oral 68 16 99 % 5' 6\" (1.676 m) 200 lb (90.7 kg)       Physical Exam  Vitals signs and nursing note reviewed. Constitutional:       General: She is not in acute distress. Appearance: She is well-developed. HENT:      Head: Normocephalic and atraumatic. Nose: Nose normal. No congestion. Eyes:      Extraocular Movements: Extraocular movements intact. Pupils: Pupils are equal, round, and reactive to light. Neck:      Musculoskeletal: Normal range of motion and neck supple. Cardiovascular:      Rate and Rhythm: Normal rate and regular rhythm. Heart sounds: No murmur.    Pulmonary: 11 7 - 20 mg/dL    CREATININE 0.8 0.6 - 1.2 mg/dL    GFR Non-African American >60 >60    GFR African American >60 >60    Calcium 9.2 8.3 - 10.6 mg/dL    Total Protein 7.4 6.4 - 8.2 g/dL    Alb 4.1 3.4 - 5.0 g/dL    Albumin/Globulin Ratio 1.2 1.1 - 2.2    Total Bilirubin 0.7 0.0 - 1.0 mg/dL    Alkaline Phosphatase 120 40 - 129 U/L    ALT 11 10 - 40 U/L    AST 19 15 - 37 U/L    Globulin 3.3 g/dL   Troponin   Result Value Ref Range    Troponin <0.01 <0.01 ng/mL   Brain Natriuretic Peptide   Result Value Ref Range    Pro- 0 - 449 pg/mL   EKG 12 Lead   Result Value Ref Range    Ventricular Rate 64 BPM    Atrial Rate 64 BPM    P-R Interval 170 ms    QRS Duration 82 ms    Q-T Interval 424 ms    QTc Calculation (Bazett) 437 ms    P Axis 9 degrees    R Axis -18 degrees    T Axis 20 degrees    Diagnosis       Normal sinus rhythmMinimal voltage criteria for LVH, may be normal variantBorderline ECGNo significant change was foundWhen compared with ECG of9.21.20Confirmed by CHEVY AU, [address] ([id number]) on 11/25/2020 5:20:50 AM         MDM  Patient seen and evaluated. Relevant records reviewed. 22-year-old female presents with chest tightness/soreness since this morning in the setting of a dry cough and after episode of palpitations. She has a history of A. fib status post ablation. Her EKG shows she is in normal sinus rhythm. Of note she has been in normal sinus rhythm recently after the ablation. On exam she is well-appearing no acute distress overall reassuring vital signs aside from asymptomatic hypertension. She has an overall reassuring physical exam.  Her labs are notable only for hypokalemia which is treated. Otherwise no metabolic derangements. Her chest x-ray does not show obvious pneumonia. Given her cough and nonspecific chest wall symptoms, will order COVID-19 test.  Patient now states that she feels that some of her family members may have had COVID-23.   Her chest pain/soreness does not sound consistent with ACS. It is not exertional and she has no ischemic pattern on her EKG. She has had a negative troponin and her symptoms are over 12 hours out and I would expect that ACS would have declared itself by now. She is low risk by calculated heart score as well. Doubt PE dissection bacterial pneumonia or other emergent cardiopulmonary process that require emergent intervention. Patient agreeable to discharge with PCP follow-up close return precautions. Discussed home quarantine. Patient agreeable to plan expressed understanding of plan. Clinical Impression:  1. Chest wall pain    2. Musculoskeletal chest pain    3. Close exposure to COVID-19 virus          Disposition:  Discharge to home in good condition. Blood pressure (!) 169/75, pulse 65, temperature 97.4 °F (36.3 °C), temperature source Oral, resp. rate 16, height 5' 6\" (1.676 m), weight 200 lb (90.7 kg), SpO2 98 %. Patient was given scripts for the following medications. I counseled patient how to take these medications. Discharge Medication List as of 11/24/2020 11:35 PM      START taking these medications    Details   potassium chloride (KLOR-CON M) 10 MEQ extended release tablet Take 1 tablet by mouth daily for 7 days, Disp-7 tablet,R-0Print             Disposition referral (if applicable):  Pascual Correa., MD  [facility] [address]  [facility]  [phone]    Schedule an appointment as soon as possible for a visit in 2 days  As needed    Your cardiologist    Schedule an appointment as soon as possible for a visit           Total critical care time is 0 minutes, which excludes separately billable procedures and updating family. Time spent is specifically for management of the presenting complaint and symptoms initially, direct bedside care, reevaluation, review of records, and consultation.   There was a high probability of clinically significant life-threatening deterioration in the patient's condition, which required my urgent intervention. This chart was generated in part by using Dragon Dictation system and may contain errors related to that system including errors in grammar, punctuation, and spelling, as well as words and phrases that may be inappropriate. If there are any questions or concerns please feel free to contact the dictating provider for clarification.      MD Rolando Green MD  11/26/20 5957

## 2020-11-26 LAB — SARS-COV-2: NOT DETECTED

## 2020-11-26 ASSESSMENT — ENCOUNTER SYMPTOMS
NAUSEA: 0
VOMITING: 0
BACK PAIN: 0
PHOTOPHOBIA: 0
COUGH: 1
DIARRHEA: 0
RHINORRHEA: 0
CHEST TIGHTNESS: 1
SHORTNESS OF BREATH: 0
ABDOMINAL DISTENTION: 0
WHEEZING: 0

## 2021-03-19 NOTE — PROGRESS NOTES
ENDOSCOPY PREOP PHONE INTERVIEW  INSTRUCTIONS:   Covid test was . Please continue to quarantine until your procedure    All patients having a procedure with sedation / anesthesia are required to be Covid tested. You will need to quarantine from the time you are tested until your procedure. Where:   Date tested: INSTRUCTED TO GO 3/20 OR 3/22    Follow all instructions / preps given to you from your doctor's office. If you have not received these instructions yet, please call the office immediately.  Enter the MAIN entrance located on [address] and report to the desk on left side of the lobby. Arrival Time: 1000 for your procedure scheduled at: [facility]   Bring your insurance & photo ID with you.  Dress comfortably. No lotion, powders or jewelry   Bring an accurate list of your medications with doses/ frequency with you day of the procedure, including over the counter medications. If you are taking blood thinners, ASA or diabetic medication, make sure to call Dr. Ami Burgess   or your PCP for instructions prior to your procedure.  Arrange for someone to be with you and sign you out & drive you home after your procedure.  We are allowing 1 visitor with you in the hospital.        If you have further questions, you may contact your Endoscopist's office or Pre Admission Testing staff at [phone]  Dang Kenny. 3/19/2021 .7:56 AM JESSE

## 2021-03-23 ENCOUNTER — OFFICE VISIT (OUTPATIENT)
Dept: PRIMARY CARE CLINIC | Age: 81
End: 2021-03-23
Payer: MEDICARE

## 2021-03-23 ENCOUNTER — ANESTHESIA EVENT (OUTPATIENT)
Dept: ENDOSCOPY | Age: 81
End: 2021-03-23
Payer: MEDICARE

## 2021-03-23 DIAGNOSIS — Z01.818 PREOP TESTING: Primary | ICD-10-CM

## 2021-03-23 PROCEDURE — G8428 CUR MEDS NOT DOCUMENT: HCPCS | Performed by: NURSE PRACTITIONER

## 2021-03-23 PROCEDURE — 99211 OFF/OP EST MAY X REQ PHY/QHP: CPT | Performed by: NURSE PRACTITIONER

## 2021-03-23 PROCEDURE — G8417 CALC BMI ABV UP PARAM F/U: HCPCS | Performed by: NURSE PRACTITIONER

## 2021-03-23 NOTE — PROGRESS NOTES
[address] received a viral test for COVID-19. They were educated on isolation and quarantine as appropriate. For any symptoms, they were directed to seek care from their PCP, given contact information to establish with a doctor, directed to an urgent care or the emergency room.

## 2021-03-24 LAB — SARS-COV-2: NOT DETECTED

## 2021-03-26 ENCOUNTER — ANESTHESIA (OUTPATIENT)
Dept: ENDOSCOPY | Age: 81
End: 2021-03-26
Payer: MEDICARE

## 2021-03-26 ENCOUNTER — HOSPITAL ENCOUNTER (OUTPATIENT)
Age: 81
Setting detail: OUTPATIENT SURGERY
Discharge: HOME | End: 2021-03-26
Attending: INTERNAL MEDICINE | Admitting: INTERNAL MEDICINE
Payer: MEDICARE

## 2021-03-26 VITALS
TEMPERATURE: 97.5 F | WEIGHT: 220 LBS | HEIGHT: 66 IN | DIASTOLIC BLOOD PRESSURE: 63 MMHG | RESPIRATION RATE: 16 BRPM | SYSTOLIC BLOOD PRESSURE: 101 MMHG | HEART RATE: 54 BPM | OXYGEN SATURATION: 95 % | BODY MASS INDEX: 35.36 KG/M2

## 2021-03-26 VITALS
SYSTOLIC BLOOD PRESSURE: 97 MMHG | OXYGEN SATURATION: 100 % | RESPIRATION RATE: 18 BRPM | DIASTOLIC BLOOD PRESSURE: 54 MMHG

## 2021-03-26 DIAGNOSIS — Z86.010 HX OF COLONIC POLYPS: ICD-10-CM

## 2021-03-26 DIAGNOSIS — K21.9 GASTROESOPHAGEAL REFLUX DISEASE, UNSPECIFIED WHETHER ESOPHAGITIS PRESENT: ICD-10-CM

## 2021-03-26 DIAGNOSIS — Z12.11 COLON CANCER SCREENING: ICD-10-CM

## 2021-03-26 PROCEDURE — 3609010200 HC COLONOSCOPY ABLATION TUMOR POLYP/OTHER LES: Performed by: INTERNAL MEDICINE

## 2021-03-26 PROCEDURE — 2580000003 HC RX 258: Performed by: ANESTHESIOLOGY

## 2021-03-26 PROCEDURE — 2709999900 HC NON-CHARGEABLE SUPPLY: Performed by: INTERNAL MEDICINE

## 2021-03-26 PROCEDURE — 88305 TISSUE EXAM BY PATHOLOGIST: CPT

## 2021-03-26 PROCEDURE — 2720000010 HC SURG SUPPLY STERILE: Performed by: INTERNAL MEDICINE

## 2021-03-26 PROCEDURE — 2500000003 HC RX 250 WO HCPCS: Performed by: INTERNAL MEDICINE

## 2021-03-26 PROCEDURE — 88342 IMHCHEM/IMCYTCHM 1ST ANTB: CPT

## 2021-03-26 PROCEDURE — 3700000000 HC ANESTHESIA ATTENDED CARE: Performed by: INTERNAL MEDICINE

## 2021-03-26 PROCEDURE — 7100000011 HC PHASE II RECOVERY - ADDTL 15 MIN: Performed by: INTERNAL MEDICINE

## 2021-03-26 PROCEDURE — 3700000001 HC ADD 15 MINUTES (ANESTHESIA): Performed by: INTERNAL MEDICINE

## 2021-03-26 PROCEDURE — 7100000010 HC PHASE II RECOVERY - FIRST 15 MIN: Performed by: INTERNAL MEDICINE

## 2021-03-26 PROCEDURE — 3609012400 HC EGD TRANSORAL BIOPSY SINGLE/MULTIPLE: Performed by: INTERNAL MEDICINE

## 2021-03-26 PROCEDURE — 6360000002 HC RX W HCPCS: Performed by: NURSE ANESTHETIST, CERTIFIED REGISTERED

## 2021-03-26 RX ORDER — SODIUM CHLORIDE 0.9 % (FLUSH) 0.9 %
10 SYRINGE (ML) INJECTION PRN
Status: DISCONTINUED | OUTPATIENT
Start: 2021-03-26 | End: 2021-03-26 | Stop reason: HOSPADM

## 2021-03-26 RX ORDER — SODIUM CHLORIDE, SODIUM LACTATE, POTASSIUM CHLORIDE, CALCIUM CHLORIDE 600; 310; 30; 20 MG/100ML; MG/100ML; MG/100ML; MG/100ML
INJECTION, SOLUTION INTRAVENOUS CONTINUOUS
Status: DISCONTINUED | OUTPATIENT
Start: 2021-03-26 | End: 2021-03-26 | Stop reason: HOSPADM

## 2021-03-26 RX ORDER — PROPOFOL 10 MG/ML
INJECTION, EMULSION INTRAVENOUS PRN
Status: DISCONTINUED | OUTPATIENT
Start: 2021-03-26 | End: 2021-03-26 | Stop reason: SDUPTHER

## 2021-03-26 RX ORDER — SODIUM CHLORIDE 0.9 % (FLUSH) 0.9 %
10 SYRINGE (ML) INJECTION EVERY 12 HOURS SCHEDULED
Status: DISCONTINUED | OUTPATIENT
Start: 2021-03-26 | End: 2021-03-26 | Stop reason: HOSPADM

## 2021-03-26 RX ORDER — LIDOCAINE HYDROCHLORIDE 20 MG/ML
INJECTION, SOLUTION INTRAVENOUS PRN
Status: DISCONTINUED | OUTPATIENT
Start: 2021-03-26 | End: 2021-03-26 | Stop reason: SDUPTHER

## 2021-03-26 RX ORDER — SODIUM CHLORIDE 9 MG/ML
INJECTION, SOLUTION INTRAVENOUS CONTINUOUS
Status: DISCONTINUED | OUTPATIENT
Start: 2021-03-26 | End: 2021-03-26 | Stop reason: HOSPADM

## 2021-03-26 RX ORDER — PROPOFOL 10 MG/ML
INJECTION, EMULSION INTRAVENOUS CONTINUOUS PRN
Status: DISCONTINUED | OUTPATIENT
Start: 2021-03-26 | End: 2021-03-26 | Stop reason: SDUPTHER

## 2021-03-26 RX ADMIN — SODIUM CHLORIDE, POTASSIUM CHLORIDE, SODIUM LACTATE AND CALCIUM CHLORIDE: 600; 310; 30; 20 INJECTION, SOLUTION INTRAVENOUS at 11:54

## 2021-03-26 RX ADMIN — LIDOCAINE HYDROCHLORIDE 75 MG: 20 INJECTION, SOLUTION INTRAVENOUS at 11:21

## 2021-03-26 RX ADMIN — PROPOFOL 100 MCG/KG/MIN: 10 INJECTION, EMULSION INTRAVENOUS at 11:20

## 2021-03-26 RX ADMIN — SODIUM CHLORIDE, POTASSIUM CHLORIDE, SODIUM LACTATE AND CALCIUM CHLORIDE: 600; 310; 30; 20 INJECTION, SOLUTION INTRAVENOUS at 09:34

## 2021-03-26 RX ADMIN — PROPOFOL 100 MG: 10 INJECTION, EMULSION INTRAVENOUS at 11:21

## 2021-03-26 ASSESSMENT — PULMONARY FUNCTION TESTS
PIF_VALUE: 1

## 2021-03-26 ASSESSMENT — PAIN SCALES - GENERAL: PAINLEVEL_OUTOF10: 0

## 2021-03-26 ASSESSMENT — ENCOUNTER SYMPTOMS: SHORTNESS OF BREATH: 1

## 2021-03-26 NOTE — H&P
History and Physical / Pre-Sedation Assessment    Patient:  Donte Mohan   :   1940     Intended Procedure:  egd and colonoscopy    HPI: non cardiac chest pain. H/o polyps. Fh of colon cancer    Past Medical History:   has a past medical history of Atrial fibrillation ([address] and Depression. Past Surgical History:   has a past surgical history that includes Hysterectomy; joint replacement; ablation of dysrhythmic focus; and Cholecystectomy. Medications:  Prior to Admission medications    Medication Sig Start Date End Date Taking? Authorizing Provider   ondansetron (ZOFRAN-ODT) 4 MG disintegrating tablet Take 1 tablet by mouth 3 times daily as needed for Nausea or Vomiting 20  Yes Cherry Conde MD   dicyclomine (BENTYL) 20 MG tablet Take 1 tablet by mouth 3 times daily as needed (abdominal spasms) 20  Yes Cherry Conde MD   Vitamin D (CHOLECALCIFEROL) 1000 UNITS CAPS capsule Take 1,000 Units by mouth 2 times daily (with meals). Yes Historical Provider, MD       Family History:  family history is not on file. Social History:   reports that she has never smoked. She has never used smokeless tobacco. She reports that she does not drink alcohol or use drugs. Allergies:  Quinidex [quinidine]    ROS:  twelve point system review was unremarkable except for above noted history. Nurses notes reviewed and agreed. Medications reviewed    Physical Exam:  Vital Signs: BP (!) 144/64   Pulse 78   Temp 97.9 °F (36.6 °C) (Oral)   Resp 18   Ht 5' 6\" (1.676 m)   Wt 220 lb (99.8 kg)   SpO2 97%   BMI 35.51 kg/m²    Skin: normal  HEENT: normal  Neck: supple. No adenopathy. No thyromegaly. No JVD. Pulmonary:Normal  Cardiac:Normal  Abdomen:Normal  MS: normal  Neuro: normal  Ext: no edema.  Pulses normal    Pre-Procedure Assessment / Plan:  ASA 2 - Patient with mild systemic disease with no functional limitations  Mallampati Airway Assessment:  Mallampati Class II - (soft palate, fauces & uvula are visible)  Level of Sedation Plan: Moderate sedation  Post Procedure plan: Return to same level of care    I assessed the patient and find that the patient is in satisfactory condition to proceed with the planned procedure and sedation plan. I have explained the risk, benefits, and alternatives to the procedure. The patient understands and agrees to proceed.   Iqra Loo  10:06 AM 3/26/2021

## 2021-03-26 NOTE — PROGRESS NOTES
Dressed and ready for son to pick her up. Discharge instructions to be reviewed with patient/responsible adult and understanding verbalized. Discharge instructions signed and copies given. Patient to be discharged home with belongings. Vivek Everett Laurence picking pt. Up and instructions will be reviewed with him before D/C.

## 2021-03-26 NOTE — ANESTHESIA PRE PROCEDURE
Department of Anesthesiology  Preprocedure Note       Name:  Elen Harvey   Age:  68 y.o.  :  1940                                          MRN:  [id number]         Date:  3/26/2021      Surgeon: Flower Rudd):  Daria Gill MD    Procedure: Procedure(s):  ESOPHAGOGASTRODUODENOSCOPY  COLONOSCOPY    Medications prior to admission:   Prior to Admission medications    Medication Sig Start Date End Date Taking? Authorizing Provider   ondansetron (ZOFRAN-ODT) 4 MG disintegrating tablet Take 1 tablet by mouth 3 times daily as needed for Nausea or Vomiting 20  Yes Reina Le MD   dicyclomine (BENTYL) 20 MG tablet Take 1 tablet by mouth 3 times daily as needed (abdominal spasms) 20  Yes Reina Le MD   Vitamin D (CHOLECALCIFEROL) 1000 UNITS CAPS capsule Take 1,000 Units by mouth 2 times daily (with meals). Yes Historical Provider, MD       Current medications:    Current Facility-Administered Medications   Medication Dose Route Frequency Provider Last Rate Last Admin    sodium chloride flush 0.9 % injection 10 mL  10 mL Intravenous 2 times per day Taty Aures, DO        sodium chloride flush 0.9 % injection 10 mL  10 mL Intravenous PRN Taty Aures, DO        0.9 % sodium chloride infusion   Intravenous Continuous Taty Aures, DO        lactated ringers infusion   Intravenous Continuous Taty Aures, DO           Allergies: Allergies   Allergen Reactions    Quinidex [Quinidine] Other (See Comments)     Reaction unknown to pt. Problem List:  There is no problem list on file for this patient.       Past Medical History:        Diagnosis Date    Atrial fibrillation ([address]     Depression        Past Surgical History:        Procedure Laterality Date    ABLATION OF DYSRHYTHMIC FOCUS      CHOLECYSTECTOMY      HYSTERECTOMY      JOINT REPLACEMENT      right knee       Social History:    Social History     Tobacco Use    Smoking status: Never Smoker    Smokeless tobacco: Never Used   Substance Use Topics    Alcohol use: Never     Frequency: Never                                Counseling given: Not Answered      Vital Signs (Current):   Vitals:    03/26/21 0831   BP: (!) 144/64   Pulse: 78   Resp: 18   Temp: 97.9 °F (36.6 °C)   TempSrc: Oral   SpO2: 97%   Weight: 220 lb (99.8 kg)   Height: 5' 6\" (1.676 m)                                              BP Readings from Last 3 Encounters:   03/26/21 (!) 144/64   11/24/20 (!) 169/75   09/21/20 (!) 108/56       NPO Status: Time of last liquid consumption: 2330                        Time of last solid consumption: 1900                        Date of last liquid consumption: 03/25/21                        Date of last solid food consumption: 03/24/21    BMI:   Wt Readings from Last 3 Encounters:   03/26/21 220 lb (99.8 kg)   11/24/20 200 lb (90.7 kg)   09/21/20 205 lb (93 kg)     Body mass index is 35.51 kg/m². CBC:   Lab Results   Component Value Date    WBC 8.7 11/24/2020    RBC 4.73 11/24/2020    HGB 14.4 11/24/2020    HCT 41.6 11/24/2020    MCV 87.9 11/24/2020    RDW 13.7 11/24/2020     11/24/2020       CMP:   Lab Results   Component Value Date     11/24/2020    K 2.8 11/24/2020    K 3.1 09/21/2020     11/24/2020    CO2 27 11/24/2020    BUN 11 11/24/2020    CREATININE 0.8 11/24/2020    GFRAA >60 11/24/2020    AGRATIO 1.2 11/24/2020    LABGLOM >60 11/24/2020    GLUCOSE 117 11/24/2020    PROT 7.4 11/24/2020    CALCIUM 9.2 11/24/2020    BILITOT 0.7 11/24/2020    ALKPHOS 120 11/24/2020    AST 19 11/24/2020    ALT 11 11/24/2020       POC Tests: No results for input(s): POCGLU, POCNA, POCK, POCCL, POCBUN, POCHEMO, POCHCT in the last 72 hours.     Coags: No results found for: PROTIME, INR, APTT    HCG (If Applicable): No results found for: PREGTESTUR, PREGSERUM, HCG, HCGQUANT     ABGs: No results found for: PHART, PO2ART, SWL5QVP, HZL5ZMD, BEART, J7VLMZAA     Type & Screen (If Applicable):  No results found for: Po [place] Drug/Infectious Status (If Applicable):  No results found for: HIV, HEPCAB    COVID-19 Screening (If Applicable):   Lab Results   Component Value Date    COVID19 Not Detected 03/23/2021           Anesthesia Evaluation    Airway: Mallampati: II  TM distance: >3 FB   Neck ROM: full  Mouth opening: > = 3 FB Dental:          Pulmonary:   (+) shortness of breath:                             Cardiovascular:  Exercise tolerance: poor (<4 METS),   (+) dysrhythmias: atrial fibrillation,                   Neuro/Psych:   (+) depression/anxiety             GI/Hepatic/Renal:             Endo/Other:                     Abdominal:           Vascular:                                      Anesthesia Plan      MAC     ASA 3     (-npo MN  -h/o afib, ablation 2009, does feel like she occasionally has some episodes of rapid HR. Very good ex jasiel, active, takes care of her 3 grandchildren as her son is in snf. Denies sob    Echo 2019  Summary:  Overall left ventricular ejection fraction is estimated to be 55-60%. Left ventricular systolic function is normal. (LVEF>/=55%)  There is borderline concentric left ventricular hypertrophy. Mild tricuspid regurgitation is present.)  Induction: intravenous. Anesthetic plan and risks discussed with patient. Plan discussed with CRNA.                 Jaime Peace MD   3/26/2021

## 2021-03-26 NOTE — ANESTHESIA POSTPROCEDURE EVALUATION
Department of Anesthesiology  Postprocedure Note    Patient: Flory Arreola  MRN: [id number]  YOB: 1940  Date of evaluation: 3/26/2021  Time:  12:49 PM     Procedure Summary     Date: 03/26/21 Room / Location: [address] [place] [address] [facility]    Anesthesia Start: 1112 Anesthesia Stop: 1159    Procedures:       EGD BIOPSY (N/A )      COLONOSCOPY POLYPECTOMY ABLATION (N/A ) Diagnosis:       Gastroesophageal reflux disease, unspecified whether esophagitis present      Colon cancer screening      Hx of colonic polyps      (Gastroesophageal reflux disease, unspecified whether esophagitis present [K21.9] Colon cancer screening [Z12.11] Hx of colonic polyps [Z86.010])    Surgeons: Caron Salguero MD Responsible Provider: Zainab Flores MD    Anesthesia Type: MAC ASA Status: 3          Anesthesia Type: MAC    Valdez Phase I: Valdez Score: 10    Valdez Phase II: Valdez Score: 10    Last vitals: Reviewed and per EMR flowsheets.        Anesthesia Post Evaluation    Patient location during evaluation: bedside  Patient participation: complete - patient participated  Level of consciousness: awake  Pain score: 0  Airway patency: patent  Nausea & Vomiting: no nausea and no vomiting  Complications: no  Cardiovascular status: hemodynamically stable  Respiratory status: acceptable  Hydration status: euvolemic

## 2021-03-27 NOTE — OP NOTE
[address], [address]                                OPERATIVE REPORT    PATIENT NAME: Catalina Richardson                      :        1940  MED REC NO:   [id number]                          ROOM:  ACCOUNT NO:   [de-identified]                           ADMIT DATE: 2021  PROVIDER:     Gera Walsh MD    DATE OF PROCEDURE:  2021    SURGEON:  Gera Walsh MD    INDICATION FOR PROCEDURE:  1. Noncardiac chest pain with dyspepsia, possible gastroesophageal  reflux disease. 2.  History of colon polyps and strong family history of colon cancer  and breast cancer. Her previous colonoscopy was poorly prepared. DESCRIPTION OF PROCEDURE:  EGD:  With the patient in the left lateral  position and after IV Diprivan, the Olympus video endoscope was  introduced into the esophagus and advanced towards the GE junction. Esophagus was normal.  Stomach was carefully inspected. It was normal.   Biopsies were obtained for Helicobacter pylori. The duodenum was  normal.  Scope was then removed without complication. COLONOSCOPY:  The Olympus video colonoscope was then inserted into the  rectum and carefully advanced to the cecum. 2 cm polyp noted in the  proximal transverse colon just near the hepatic flexure. This polyp was  removed using a polypectomy snare technique. The site was then clipped  using Resolution Clip to prevent bleeding. Careful inspection revealed  no other abnormality. The colon was much better prepared today. Scope  was then removed without complication. IMPRESSION:  1. Normal upper endoscopy. 2.  2 cm transverse colon polyp. PLAN:  Followup colonoscopy is recommended in three years. ESTIMATED BLOOD LOSS:  None. Trixie Schmitz MD    D: 2021 12:13:44       T: 2021 13:33:12     [email or web address]  Job#: [id number]     Doc#: [id number]    CC:   Gera Walsh MD

## 2021-09-04 NOTE — PATIENT INSTRUCTIONS

## 2021-12-16 ENCOUNTER — APPOINTMENT (OUTPATIENT)
Dept: CT IMAGING | Age: 81
DRG: 700 | End: 2021-12-16
Payer: MEDICARE

## 2021-12-16 ENCOUNTER — HOSPITAL ENCOUNTER (INPATIENT)
Age: 81
LOS: 1 days | Discharge: HOME | DRG: 700 | End: 2021-12-19
Attending: EMERGENCY MEDICINE | Admitting: INTERNAL MEDICINE
Payer: MEDICARE

## 2021-12-16 DIAGNOSIS — R11.0 NAUSEA: ICD-10-CM

## 2021-12-16 DIAGNOSIS — N28.0 KIDNEY INFARCTION (HCC): Primary | ICD-10-CM

## 2021-12-16 DIAGNOSIS — R10.9 ABDOMINAL PAIN, UNSPECIFIED ABDOMINAL LOCATION: ICD-10-CM

## 2021-12-16 LAB
A/G RATIO: 1.2 (ref 1.1–2.2)
ALBUMIN SERPL-MCNC: 4 G/DL (ref 3.4–5)
ALP BLD-CCNC: 129 U/L (ref 40–129)
ALT SERPL-CCNC: 12 U/L (ref 10–40)
ANION GAP SERPL CALCULATED.3IONS-SCNC: 13 MMOL/L (ref 3–16)
AST SERPL-CCNC: 26 U/L (ref 15–37)
BACTERIA: ABNORMAL /HPF
BASOPHILS ABSOLUTE: 0.1 K/UL (ref 0–0.2)
BASOPHILS RELATIVE PERCENT: 0.6 %
BILIRUB SERPL-MCNC: 0.7 MG/DL (ref 0–1)
BILIRUBIN URINE: NEGATIVE
BLOOD, URINE: NEGATIVE
BUN BLDV-MCNC: 17 MG/DL (ref 7–20)
CALCIUM SERPL-MCNC: 9.4 MG/DL (ref 8.3–10.6)
CHLORIDE BLD-SCNC: 100 MMOL/L (ref 99–110)
CLARITY: CLEAR
CO2: 23 MMOL/L (ref 21–32)
COLOR: YELLOW
CREAT SERPL-MCNC: 1.2 MG/DL (ref 0.6–1.2)
EOSINOPHILS ABSOLUTE: 0 K/UL (ref 0–0.6)
EOSINOPHILS RELATIVE PERCENT: 0.3 %
GFR AFRICAN AMERICAN: 52
GFR NON-AFRICAN AMERICAN: 43
GLUCOSE BLD-MCNC: 129 MG/DL (ref 70–99)
GLUCOSE URINE: NEGATIVE MG/DL
HCT VFR BLD CALC: 41.5 % (ref 36–48)
HEMOGLOBIN: 14.5 G/DL (ref 12–16)
KETONES, URINE: ABNORMAL MG/DL
LACTIC ACID, SEPSIS: 1.1 MMOL/L (ref 0.4–1.9)
LEUKOCYTE ESTERASE, URINE: ABNORMAL
LIPASE: 15 U/L (ref 13–60)
LYMPHOCYTES ABSOLUTE: 1.2 K/UL (ref 1–5.1)
LYMPHOCYTES RELATIVE PERCENT: 12.1 %
MAGNESIUM: 1.5 MG/DL (ref 1.8–2.4)
MCH RBC QN AUTO: 30.8 PG (ref 26–34)
MCHC RBC AUTO-ENTMCNC: 35 G/DL (ref 31–36)
MCV RBC AUTO: 87.9 FL (ref 80–100)
MICROSCOPIC EXAMINATION: YES
MONOCYTES ABSOLUTE: 0.6 K/UL (ref 0–1.3)
MONOCYTES RELATIVE PERCENT: 6.1 %
NEUTROPHILS ABSOLUTE: 8.2 K/UL (ref 1.7–7.7)
NEUTROPHILS RELATIVE PERCENT: 80.9 %
NITRITE, URINE: NEGATIVE
PDW BLD-RTO: 13 % (ref 12.4–15.4)
PH UA: 6 (ref 5–8)
PLATELET # BLD: 253 K/UL (ref 135–450)
PMV BLD AUTO: 7.4 FL (ref 5–10.5)
POTASSIUM REFLEX MAGNESIUM: 3.5 MMOL/L (ref 3.5–5.1)
PROTEIN UA: ABNORMAL MG/DL
RBC # BLD: 4.72 M/UL (ref 4–5.2)
RBC UA: ABNORMAL /HPF (ref 0–4)
SODIUM BLD-SCNC: 136 MMOL/L (ref 136–145)
SPECIFIC GRAVITY UA: 1.02 (ref 1–1.03)
TOTAL PROTEIN: 7.3 G/DL (ref 6.4–8.2)
TROPONIN: <0.01 NG/ML
URINE REFLEX TO CULTURE: ABNORMAL
URINE TYPE: ABNORMAL
UROBILINOGEN, URINE: 1 E.U./DL
WBC # BLD: 10.2 K/UL (ref 4–11)
WBC UA: ABNORMAL /HPF (ref 0–5)

## 2021-12-16 PROCEDURE — 74177 CT ABD & PELVIS W/CONTRAST: CPT

## 2021-12-16 PROCEDURE — 83605 ASSAY OF LACTIC ACID: CPT

## 2021-12-16 PROCEDURE — 96375 TX/PRO/DX INJ NEW DRUG ADDON: CPT

## 2021-12-16 PROCEDURE — 83735 ASSAY OF MAGNESIUM: CPT

## 2021-12-16 PROCEDURE — 80053 COMPREHEN METABOLIC PANEL: CPT

## 2021-12-16 PROCEDURE — 96376 TX/PRO/DX INJ SAME DRUG ADON: CPT

## 2021-12-16 PROCEDURE — 6360000002 HC RX W HCPCS: Performed by: EMERGENCY MEDICINE

## 2021-12-16 PROCEDURE — 85025 COMPLETE CBC W/AUTO DIFF WBC: CPT

## 2021-12-16 PROCEDURE — 6360000004 HC RX CONTRAST MEDICATION: Performed by: PHYSICIAN ASSISTANT

## 2021-12-16 PROCEDURE — 2580000003 HC RX 258: Performed by: PHYSICIAN ASSISTANT

## 2021-12-16 PROCEDURE — 93005 ELECTROCARDIOGRAM TRACING: CPT | Performed by: PHYSICIAN ASSISTANT

## 2021-12-16 PROCEDURE — 83690 ASSAY OF LIPASE: CPT

## 2021-12-16 PROCEDURE — 81003 URINALYSIS AUTO W/O SCOPE: CPT

## 2021-12-16 PROCEDURE — 99284 EMERGENCY DEPT VISIT MOD MDM: CPT

## 2021-12-16 PROCEDURE — 6360000002 HC RX W HCPCS: Performed by: PHYSICIAN ASSISTANT

## 2021-12-16 PROCEDURE — 96365 THER/PROPH/DIAG IV INF INIT: CPT

## 2021-12-16 PROCEDURE — 84484 ASSAY OF TROPONIN QUANT: CPT

## 2021-12-16 RX ORDER — ONDANSETRON 2 MG/ML
4 INJECTION INTRAMUSCULAR; INTRAVENOUS EVERY 30 MIN PRN
Status: DISCONTINUED | OUTPATIENT
Start: 2021-12-16 | End: 2021-12-17 | Stop reason: SDUPTHER

## 2021-12-16 RX ORDER — MORPHINE SULFATE 2 MG/ML
2 INJECTION, SOLUTION INTRAMUSCULAR; INTRAVENOUS ONCE
Status: COMPLETED | OUTPATIENT
Start: 2021-12-16 | End: 2021-12-16

## 2021-12-16 RX ORDER — 0.9 % SODIUM CHLORIDE 0.9 %
1000 INTRAVENOUS SOLUTION INTRAVENOUS ONCE
Status: COMPLETED | OUTPATIENT
Start: 2021-12-16 | End: 2021-12-17

## 2021-12-16 RX ORDER — MAGNESIUM SULFATE 1 G/100ML
1000 INJECTION INTRAVENOUS ONCE
Status: COMPLETED | OUTPATIENT
Start: 2021-12-16 | End: 2021-12-17

## 2021-12-16 RX ORDER — ONDANSETRON 2 MG/ML
4 INJECTION INTRAMUSCULAR; INTRAVENOUS ONCE
Status: COMPLETED | OUTPATIENT
Start: 2021-12-16 | End: 2021-12-16

## 2021-12-16 RX ADMIN — ONDANSETRON HYDROCHLORIDE 4 MG: 2 INJECTION, SOLUTION INTRAMUSCULAR; INTRAVENOUS at 21:19

## 2021-12-16 RX ADMIN — HYDROMORPHONE HYDROCHLORIDE 1 MG: 1 INJECTION, SOLUTION INTRAMUSCULAR; INTRAVENOUS; SUBCUTANEOUS at 22:58

## 2021-12-16 RX ADMIN — IOPAMIDOL 75 ML: 755 INJECTION, SOLUTION INTRAVENOUS at 22:13

## 2021-12-16 RX ADMIN — MORPHINE SULFATE 2 MG: 2 INJECTION, SOLUTION INTRAMUSCULAR; INTRAVENOUS at 21:19

## 2021-12-16 RX ADMIN — SODIUM CHLORIDE 1000 ML: 9 INJECTION, SOLUTION INTRAVENOUS at 22:39

## 2021-12-16 RX ADMIN — ONDANSETRON HYDROCHLORIDE 4 MG: 2 INJECTION, SOLUTION INTRAMUSCULAR; INTRAVENOUS at 23:02

## 2021-12-16 RX ADMIN — MAGNESIUM SULFATE HEPTAHYDRATE 1000 MG: 1 INJECTION, SOLUTION INTRAVENOUS at 23:42

## 2021-12-16 ASSESSMENT — PAIN DESCRIPTION - LOCATION
LOCATION: ABDOMEN
LOCATION: ABDOMEN

## 2021-12-16 ASSESSMENT — PAIN SCALES - GENERAL
PAINLEVEL_OUTOF10: 6
PAINLEVEL_OUTOF10: 7
PAINLEVEL_OUTOF10: 6
PAINLEVEL_OUTOF10: 7

## 2021-12-16 ASSESSMENT — ENCOUNTER SYMPTOMS
NAUSEA: 1
VOMITING: 0
ABDOMINAL PAIN: 1
COUGH: 0
CONSTIPATION: 0
SHORTNESS OF BREATH: 0
DIARRHEA: 0

## 2021-12-16 ASSESSMENT — PAIN DESCRIPTION - PAIN TYPE: TYPE: ACUTE PAIN

## 2021-12-16 ASSESSMENT — PAIN DESCRIPTION - ONSET: ONSET: ON-GOING

## 2021-12-17 PROBLEM — N28.0 RENAL INFARCT (HCC): Status: ACTIVE | Noted: 2021-12-17

## 2021-12-17 PROBLEM — I48.0 PAF (PAROXYSMAL ATRIAL FIBRILLATION) (HCC): Status: ACTIVE | Noted: 2021-12-17

## 2021-12-17 PROBLEM — R09.02 HYPOXIA: Status: ACTIVE | Noted: 2021-12-17

## 2021-12-17 PROBLEM — R10.9 ABDOMINAL PAIN: Status: ACTIVE | Noted: 2021-12-17

## 2021-12-17 LAB
ANION GAP SERPL CALCULATED.3IONS-SCNC: 11 MMOL/L (ref 3–16)
BASOPHILS ABSOLUTE: 0.1 K/UL (ref 0–0.2)
BASOPHILS RELATIVE PERCENT: 0.5 %
BUN BLDV-MCNC: 15 MG/DL (ref 7–20)
CALCIUM SERPL-MCNC: 9 MG/DL (ref 8.3–10.6)
CHLORIDE BLD-SCNC: 100 MMOL/L (ref 99–110)
CO2: 24 MMOL/L (ref 21–32)
CREAT SERPL-MCNC: 1.3 MG/DL (ref 0.6–1.2)
EKG ATRIAL RATE: 77 BPM
EKG DIAGNOSIS: NORMAL
EKG P AXIS: 47 DEGREES
EKG P-R INTERVAL: 156 MS
EKG Q-T INTERVAL: 380 MS
EKG QRS DURATION: 78 MS
EKG QTC CALCULATION (BAZETT): 430 MS
EKG R AXIS: -5 DEGREES
EKG T AXIS: 30 DEGREES
EKG VENTRICULAR RATE: 77 BPM
EOSINOPHILS ABSOLUTE: 0 K/UL (ref 0–0.6)
EOSINOPHILS RELATIVE PERCENT: 0.1 %
GFR AFRICAN AMERICAN: 48
GFR NON-AFRICAN AMERICAN: 39
GLUCOSE BLD-MCNC: 132 MG/DL (ref 70–99)
HCT VFR BLD CALC: 39.7 % (ref 36–48)
HEMOGLOBIN: 13.2 G/DL (ref 12–16)
INFLUENZA A: NOT DETECTED
INFLUENZA B: NOT DETECTED
LV EF: 55 %
LVEF MODALITY: NORMAL
LYMPHOCYTES ABSOLUTE: 1.3 K/UL (ref 1–5.1)
LYMPHOCYTES RELATIVE PERCENT: 7.2 %
MAGNESIUM: 1.7 MG/DL (ref 1.8–2.4)
MCH RBC QN AUTO: 30 PG (ref 26–34)
MCHC RBC AUTO-ENTMCNC: 33.3 G/DL (ref 31–36)
MCV RBC AUTO: 90.2 FL (ref 80–100)
MONOCYTES ABSOLUTE: 1.4 K/UL (ref 0–1.3)
MONOCYTES RELATIVE PERCENT: 8 %
NEUTROPHILS ABSOLUTE: 14.9 K/UL (ref 1.7–7.7)
NEUTROPHILS RELATIVE PERCENT: 84.2 %
PDW BLD-RTO: 13 % (ref 12.4–15.4)
PLATELET # BLD: 201 K/UL (ref 135–450)
PMV BLD AUTO: 7.1 FL (ref 5–10.5)
POTASSIUM REFLEX MAGNESIUM: 4.4 MMOL/L (ref 3.5–5.1)
PROCALCITONIN: 0.33 NG/ML (ref 0–0.15)
RBC # BLD: 4.4 M/UL (ref 4–5.2)
SARS-COV-2 RNA, RT PCR: NOT DETECTED
SODIUM BLD-SCNC: 135 MMOL/L (ref 136–145)
WBC # BLD: 17.6 K/UL (ref 4–11)

## 2021-12-17 PROCEDURE — 83735 ASSAY OF MAGNESIUM: CPT

## 2021-12-17 PROCEDURE — 2700000000 HC OXYGEN THERAPY PER DAY

## 2021-12-17 PROCEDURE — 6370000000 HC RX 637 (ALT 250 FOR IP): Performed by: INTERNAL MEDICINE

## 2021-12-17 PROCEDURE — G0378 HOSPITAL OBSERVATION PER HR: HCPCS

## 2021-12-17 PROCEDURE — 6360000002 HC RX W HCPCS: Performed by: INTERNAL MEDICINE

## 2021-12-17 PROCEDURE — 6370000000 HC RX 637 (ALT 250 FOR IP): Performed by: NURSE PRACTITIONER

## 2021-12-17 PROCEDURE — 2580000003 HC RX 258: Performed by: NURSE PRACTITIONER

## 2021-12-17 PROCEDURE — 96372 THER/PROPH/DIAG INJ SC/IM: CPT

## 2021-12-17 PROCEDURE — 85025 COMPLETE CBC W/AUTO DIFF WBC: CPT

## 2021-12-17 PROCEDURE — 93010 ELECTROCARDIOGRAM REPORT: CPT | Performed by: INTERNAL MEDICINE

## 2021-12-17 PROCEDURE — 80048 BASIC METABOLIC PNL TOTAL CA: CPT

## 2021-12-17 PROCEDURE — 93306 TTE W/DOPPLER COMPLETE: CPT

## 2021-12-17 PROCEDURE — 87636 SARSCOV2 & INF A&B AMP PRB: CPT

## 2021-12-17 PROCEDURE — 2580000003 HC RX 258: Performed by: INTERNAL MEDICINE

## 2021-12-17 PROCEDURE — 96376 TX/PRO/DX INJ SAME DRUG ADON: CPT

## 2021-12-17 PROCEDURE — 96366 THER/PROPH/DIAG IV INF ADDON: CPT

## 2021-12-17 PROCEDURE — 6360000002 HC RX W HCPCS: Performed by: NURSE PRACTITIONER

## 2021-12-17 PROCEDURE — 94761 N-INVAS EAR/PLS OXIMETRY MLT: CPT

## 2021-12-17 PROCEDURE — 84145 PROCALCITONIN (PCT): CPT

## 2021-12-17 RX ORDER — LEVOTHYROXINE SODIUM 0.03 MG/1
50 TABLET ORAL DAILY
Status: DISCONTINUED | OUTPATIENT
Start: 2021-12-17 | End: 2021-12-19 | Stop reason: HOSPADM

## 2021-12-17 RX ORDER — SODIUM CHLORIDE 9 MG/ML
INJECTION, SOLUTION INTRAVENOUS CONTINUOUS
Status: DISCONTINUED | OUTPATIENT
Start: 2021-12-17 | End: 2021-12-19

## 2021-12-17 RX ORDER — DULOXETIN HYDROCHLORIDE 30 MG/1
30 CAPSULE, DELAYED RELEASE ORAL DAILY
Status: DISCONTINUED | OUTPATIENT
Start: 2021-12-17 | End: 2021-12-17

## 2021-12-17 RX ORDER — SOTALOL HYDROCHLORIDE 80 MG/1
TABLET ORAL
Status: ON HOLD | COMMUNITY
Start: 2021-04-09 | End: 2021-12-19 | Stop reason: HOSPADM

## 2021-12-17 RX ORDER — CYCLOBENZAPRINE HCL 10 MG
TABLET ORAL
COMMUNITY
Start: 2021-08-23

## 2021-12-17 RX ORDER — TRAMADOL HYDROCHLORIDE 50 MG/1
50 TABLET ORAL EVERY 6 HOURS PRN
Status: DISCONTINUED | OUTPATIENT
Start: 2021-12-17 | End: 2021-12-19 | Stop reason: HOSPADM

## 2021-12-17 RX ORDER — ACETAMINOPHEN 325 MG/1
650 TABLET ORAL EVERY 6 HOURS PRN
Status: DISCONTINUED | OUTPATIENT
Start: 2021-12-17 | End: 2021-12-19 | Stop reason: HOSPADM

## 2021-12-17 RX ORDER — ONDANSETRON 2 MG/ML
4 INJECTION INTRAMUSCULAR; INTRAVENOUS EVERY 6 HOURS PRN
Status: DISCONTINUED | OUTPATIENT
Start: 2021-12-17 | End: 2021-12-19 | Stop reason: HOSPADM

## 2021-12-17 RX ORDER — URSODIOL 300 MG/1
300 CAPSULE ORAL 2 TIMES DAILY
Status: DISCONTINUED | OUTPATIENT
Start: 2021-12-17 | End: 2021-12-19 | Stop reason: HOSPADM

## 2021-12-17 RX ORDER — ONDANSETRON 4 MG/1
4 TABLET, ORALLY DISINTEGRATING ORAL EVERY 8 HOURS PRN
Status: DISCONTINUED | OUTPATIENT
Start: 2021-12-17 | End: 2021-12-19 | Stop reason: HOSPADM

## 2021-12-17 RX ORDER — MAGNESIUM SULFATE 1 G/100ML
1000 INJECTION INTRAVENOUS ONCE
Status: COMPLETED | OUTPATIENT
Start: 2021-12-17 | End: 2021-12-17

## 2021-12-17 RX ORDER — SODIUM CHLORIDE 0.9 % (FLUSH) 0.9 %
5-40 SYRINGE (ML) INJECTION EVERY 12 HOURS SCHEDULED
Status: DISCONTINUED | OUTPATIENT
Start: 2021-12-17 | End: 2021-12-19 | Stop reason: HOSPADM

## 2021-12-17 RX ORDER — SOTALOL HYDROCHLORIDE 80 MG/1
80 TABLET ORAL 2 TIMES DAILY
Status: DISCONTINUED | OUTPATIENT
Start: 2021-12-17 | End: 2021-12-17

## 2021-12-17 RX ORDER — ZOLPIDEM TARTRATE 5 MG/1
5 TABLET ORAL NIGHTLY PRN
COMMUNITY

## 2021-12-17 RX ORDER — SODIUM CHLORIDE 9 MG/ML
25 INJECTION, SOLUTION INTRAVENOUS PRN
Status: DISCONTINUED | OUTPATIENT
Start: 2021-12-17 | End: 2021-12-19 | Stop reason: HOSPADM

## 2021-12-17 RX ORDER — OXYCODONE HYDROCHLORIDE AND ACETAMINOPHEN 5; 325 MG/1; MG/1
1 TABLET ORAL EVERY 4 HOURS PRN
Status: DISCONTINUED | OUTPATIENT
Start: 2021-12-17 | End: 2021-12-19 | Stop reason: HOSPADM

## 2021-12-17 RX ORDER — LEVOTHYROXINE SODIUM 0.05 MG/1
50 TABLET ORAL DAILY
COMMUNITY
Start: 2021-01-04

## 2021-12-17 RX ORDER — ACETAMINOPHEN 650 MG/1
650 SUPPOSITORY RECTAL EVERY 6 HOURS PRN
Status: DISCONTINUED | OUTPATIENT
Start: 2021-12-17 | End: 2021-12-19 | Stop reason: HOSPADM

## 2021-12-17 RX ORDER — MECLIZINE HYDROCHLORIDE 25 MG/1
TABLET ORAL
COMMUNITY
Start: 2021-06-28

## 2021-12-17 RX ORDER — URSODIOL 250 MG/1
TABLET, FILM COATED ORAL
COMMUNITY

## 2021-12-17 RX ORDER — POLYETHYLENE GLYCOL 3350 17 G/17G
17 POWDER, FOR SOLUTION ORAL DAILY PRN
Status: DISCONTINUED | OUTPATIENT
Start: 2021-12-17 | End: 2021-12-19 | Stop reason: HOSPADM

## 2021-12-17 RX ORDER — SODIUM CHLORIDE 0.9 % (FLUSH) 0.9 %
5-40 SYRINGE (ML) INJECTION PRN
Status: DISCONTINUED | OUTPATIENT
Start: 2021-12-17 | End: 2021-12-19 | Stop reason: HOSPADM

## 2021-12-17 RX ORDER — HYDROCHLOROTHIAZIDE 25 MG/1
12.5 TABLET ORAL DAILY
Status: DISCONTINUED | OUTPATIENT
Start: 2021-12-17 | End: 2021-12-17

## 2021-12-17 RX ADMIN — APIXABAN 10 MG: 5 TABLET, FILM COATED ORAL at 21:58

## 2021-12-17 RX ADMIN — SODIUM CHLORIDE, PRESERVATIVE FREE 10 ML: 5 INJECTION INTRAVENOUS at 09:28

## 2021-12-17 RX ADMIN — OXYCODONE HYDROCHLORIDE AND ACETAMINOPHEN 1 TABLET: 5; 325 TABLET ORAL at 10:42

## 2021-12-17 RX ADMIN — DULOXETINE HYDROCHLORIDE 30 MG: 30 CAPSULE, DELAYED RELEASE ORAL at 09:20

## 2021-12-17 RX ADMIN — HYDROMORPHONE HYDROCHLORIDE 0.5 MG: 1 INJECTION, SOLUTION INTRAMUSCULAR; INTRAVENOUS; SUBCUTANEOUS at 06:33

## 2021-12-17 RX ADMIN — URSODIOL 300 MG: 300 CAPSULE ORAL at 23:31

## 2021-12-17 RX ADMIN — SERTRALINE HYDROCHLORIDE 50 MG: 50 TABLET ORAL at 09:20

## 2021-12-17 RX ADMIN — SOTALOL HYDROCHLORIDE 80 MG: 80 TABLET ORAL at 09:21

## 2021-12-17 RX ADMIN — LEVOTHYROXINE SODIUM 50 MCG: 25 TABLET ORAL at 06:33

## 2021-12-17 RX ADMIN — SODIUM CHLORIDE: 9 INJECTION, SOLUTION INTRAVENOUS at 14:20

## 2021-12-17 RX ADMIN — ONDANSETRON HYDROCHLORIDE 4 MG: 2 INJECTION, SOLUTION INTRAMUSCULAR; INTRAVENOUS at 21:58

## 2021-12-17 RX ADMIN — MAGNESIUM SULFATE HEPTAHYDRATE 1000 MG: 1 INJECTION, SOLUTION INTRAVENOUS at 17:12

## 2021-12-17 RX ADMIN — URSODIOL 300 MG: 300 CAPSULE ORAL at 14:19

## 2021-12-17 RX ADMIN — ENOXAPARIN SODIUM 40 MG: 100 INJECTION SUBCUTANEOUS at 09:21

## 2021-12-17 RX ADMIN — ONDANSETRON HYDROCHLORIDE 4 MG: 2 INJECTION, SOLUTION INTRAMUSCULAR; INTRAVENOUS at 06:33

## 2021-12-17 RX ADMIN — ACETAMINOPHEN 650 MG: 325 TABLET ORAL at 21:58

## 2021-12-17 RX ADMIN — HYDROCHLOROTHIAZIDE 12.5 MG: 25 TABLET ORAL at 09:20

## 2021-12-17 ASSESSMENT — PAIN DESCRIPTION - LOCATION
LOCATION: ABDOMEN
LOCATION: HEAD

## 2021-12-17 ASSESSMENT — PAIN DESCRIPTION - ONSET: ONSET: PROGRESSIVE

## 2021-12-17 ASSESSMENT — PAIN SCALES - GENERAL
PAINLEVEL_OUTOF10: 6
PAINLEVEL_OUTOF10: 3
PAINLEVEL_OUTOF10: 4

## 2021-12-17 ASSESSMENT — PAIN DESCRIPTION - FREQUENCY
FREQUENCY: OTHER (COMMENT)
FREQUENCY: CONTINUOUS

## 2021-12-17 ASSESSMENT — PAIN DESCRIPTION - PAIN TYPE: TYPE: ACUTE PAIN

## 2021-12-17 NOTE — ED NOTES
Pt oxygen saturation 87-88% RA, pt encouraged to deep breath, oxygen level increased to 95% RA.      Len Noe RN  12/17/21 0028

## 2021-12-17 NOTE — FLOWSHEET NOTE
12/17/21 1310   Vital Signs   Temp 98.4 °F (36.9 °C)   Temp Source Oral   Pulse 75   Resp 18   /66   BP Location Right upper arm   Patient Position Sitting   Level of Consciousness Alert (0)   MEWS Score 1   Oxygen Therapy   SpO2 96 %   O2 Device Nasal cannula   O2 Flow Rate (L/min) 2 L/min   Height and Weight   Weight 204 lb 9.6 oz (92.8 kg)   Weight Method Standing scale; Actual   BMI (Calculated) 0   Pt. Resting in bed. Call light in reach. Shift assessment completed see flow sheet. Denies any needs at this time. Will continue to monitor.

## 2021-12-17 NOTE — ED NOTES
Pt assisted to [facility], assisted back to bed in position of comfort. Pt medicated for pain and nausea per request. Denies any other needs at this time.      Rosey Kraft RN  12/17/21 0287

## 2021-12-17 NOTE — CARE COORDINATION
Case Management Assessment  Initial Evaluation      Patient Name: Rosalva Turcios  YOB: 1940  Diagnosis: Nausea [R11.0]  Abdominal pain [R10.9]  Kidney infarction [facility]) [N28.0]  Abdominal pain, unspecified abdominal location [R10.9]  Date / Time: 12/16/2021  8:43 PM    Admission status/Date:12/16/2021 OBS  Chart Reviewed: Yes      Patient Interviewed: Yes   Family Interviewed:  No      Hospitalization in the last 30 days:  No      Health Care Decision Maker :   Primary Decision Maker: Angie Michael  Child - [phone]    (CM - must 1st enter selection under Navigator - emergency contact- [place] Relationship and pick relationship)   Who do you trust or have selected to make healthcare decisions for you      Met with: pt  Interview conducted  (bedside/phone): bedside    Current PCP: Trent Ma MD    Financial  Medicare  Precert required for SNF : Y, N          3 night stay required - Y, N    ADLS  Support Systems/Care Needs: Children, Family Members  Transportation: self    Meal Preparation: self    Housing  Living Arrangements: ranch with 3 grandkids (age 8,16,17)  Steps: 0  Intent for return to present living arrangements: Yes  Identified Issues: Pt states that she has raised the grandkids since birth    Home Care Information  Active with [address] : No Agency:(Services)  Type of Home Care Services: None  Passport/Waiver : No  :                      Phone Number:    Passport/Waiver Services: n/a          Durable Medical Equiptment   DME Provider: n/a  Equipment: n/a  Walker___Cane___RTS___ BSC___Shower Chair___Hospital Bed___W/C____Other________  02 at ____Liter(s)---wears(frequency)_______ HHN ___ CPAP___ BiPap___   N/A____      Home O2 Use :  No    If No for home O2---if presently on O2 during hospitalization:  No  if yes CM to follow for potential DC O2 need  Informed of need for care provider to bring portable home O2 tank on day of discharge for nursing to connect prior to leaving:   Not Indicated  Verbalized agreement/Understanding:   Not Indicated    Community Service Affiliation  Dialysis:  No    · Agency:  · Location:  · Dialysis Schedule:  · Phone:   · Fax: Other Community Services: (ex:PT/OT,Mental Health,Wound Clinic, Cardio/Pul [address])    DISCHARGE PLAN: Explained Case Management role/services. Reviewed chart. Role of discharge planner explained and patient verbalized understanding. Pt states that she is independent. Pt states that she lives in a ranch home with her 3 grandkids ages 8,16,17. She states she has raised them since birth. She plans to return. Pt states she is self sufficient and denies needs. CM is not following pt. If CM is needed, please contact CM.

## 2021-12-17 NOTE — ED NOTES
Pt resting comfortably in bed at this time. Denies needs. Call light within immediate reach.      Liliya Bennett RN  12/17/21 7738

## 2021-12-17 NOTE — H&P
Hospital Medicine History & Physical      PCP: Luciano Dsouza MD    Date of Admission: 12/16/2021    Date of Service: Pt seen/examined on 12/17/21      Chief Complaint:    Chief Complaint   Patient presents with    Abdominal Pain     starting today, feels nauseous.  Nausea         History Of Present Illness: The patient is a 80 y.o. 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 History obtained from the patient and review of EMR. Pt stated her adbominal pain started yesterday at 11 am.  She stated at its worst it was a 9/10 and now a 5/10. She stated that she has some nausea associated with the pain, no vomiting. She did not take anything at home to help the pain. She stated that he medicine they are giving her now is helping the pain. Currently pain is a 5. She describes the pain as a constant dull pain that starts in mid abdomen and radiates to right back. She denies any diarrhea or constipation. Pt stated she has had generalized weakness ongoing for the last 6 months. She is raising 3 grandchildren at home. She denies any shortness of breath, chest pain, dizziness, fever, or chills. She stated that she has a hx of PAF s/p ablation and was on blood thinners until post ablation. She does have occasional palpitations. She stated that she had a holter monitor months ago and did not show any PAF. In ED CT of abdomen showed acute appearing infarct within the right kidney- involving the upper pole as well as the interpolar region. Per ER noted they consulted with vascular Dr. Devorah Wang and discussed case with him. He recommends anticoagulation with either Eliquis or Xarelto and follow- up with vascular surgery.          Past Medical History:        Diagnosis Date    Atrial fibrillation ([address]     Depression        Past Surgical History:        Procedure Laterality Date    ABLATION OF DYSRHYTHMIC FOCUS  CHOLECYSTECTOMY      COLONOSCOPY N/A 3/26/2021    COLONOSCOPY POLYPECTOMY ABLATION performed by Ora Yeung MD at [facility]      right knee    UPPER GASTROINTESTINAL ENDOSCOPY N/A 3/26/2021    EGD BIOPSY performed by Ora Yeung MD at [facility]COPY       Medications Prior to Admission:    Prior to Admission medications    Medication Sig Start Date End Date Taking? Authorizing Provider   Vitamin D (CHOLECALCIFEROL) 1000 UNITS CAPS capsule Take 1,000 Units by mouth 2 times daily (with meals). Historical Provider, MD       Allergies:  Barbra Gill    Social History:  The patient currently lives home    TOBACCO:   reports that she has never smoked. She has never used smokeless tobacco.  ETOH:   reports no history of alcohol use. Family History:   Positive as follows:    History reviewed. No pertinent family history. REVIEW OF SYSTEMS:       Constitutional: Negative for fever + generalized weakness   HENT: Negative for sore throat   Eyes: Negative for redness   Respiratory: Negative  for dyspnea, cough   Cardiovascular: Negative for chest pain   Gastrointestinal: Negative for vomiting, diarrhea  + nausea and abdominal/back pain   Genitourinary: Negative for hematuria   Musculoskeletal: + back pain right sided   Skin: Negative for rash   Neurological: Negative for syncope   Hematological: Negative for adenopathy   Psychiatric/Behavorial: Negative for anxiety    PHYSICAL EXAM:    /61   Pulse 90   Temp 99.2 °F (37.3 °C) (Temporal)   Resp 16   Wt 210 lb (95.3 kg)   SpO2 96%   BMI 33.89 kg/m²     Gen: No distress. Alert. Eyes: PERRL. No sclera icterus. No conjunctival injection. ENT: No discharge. Pharynx clear. Neck: No JVD. Trachea midline. Resp: No accessory muscle use. No crackles. No wheezes. No rhonchi. CV: Regular rate. Regular rhythm. No murmur. No rub. No edema. GI: TTP right lowe/mid abdomen. Non-distended. No masses. No organomegaly. Normal bowel sounds. No hernia. Skin: Warm and dry. No nodule on exposed extremities. No rash on exposed extremities. M/S: No cyanosis. No joint deformity. No clubbing. Neuro: Awake. Grossly nonfocal    Psych: Oriented x 3. No anxiety or agitation. CBC:   Recent Labs     12/16/21 2043   WBC 10.2   HGB 14.5   HCT 41.5   MCV 87.9        BMP:   Recent Labs     12/16/21 2043      K 3.5      CO2 23   BUN 17   CREATININE 1.2     LIVER PROFILE:   Recent Labs     12/16/21 2043   AST 26   ALT 12   LIPASE 15.0   BILITOT 0.7   ALKPHOS 129     UA:  Recent Labs     12/16/21 2136   COLORU Yellow   PHUR 6.0   WBCUA 3-5   RBCUA 5-10*   BACTERIA Rare*   CLARITYU Clear   SPECGRAV 1.020   LEUKOCYTESUR SMALL*   UROBILINOGEN 1.0   BILIRUBINUR Negative   BLOODU Negative   GLUCOSEU Negative         CARDIAC ENZYMES  Recent Labs     12/16/21 2043   TROPONINI <0.01       U/A:    Lab Results   Component Value Date    COLORU Yellow 12/16/2021    WBCUA 3-5 12/16/2021    RBCUA 5-10 12/16/2021    BACTERIA Rare 12/16/2021    CLARITYU Clear 12/16/2021    SPECGRAV 1.020 12/16/2021    LEUKOCYTESUR SMALL 12/16/2021    BLOODU Negative 12/16/2021    GLUCOSEU Negative 12/16/2021       ABG  No results found for: MVA4ORQ, BEART, Y7EZWFBZ, PHART, THGBART, HCJ4TCC, PO2ART, LXE9TEG    CULTURES   SARS-CoV-2 RNA, RT PCR NOT DETECTED        INFLUENZA A NOT DETECTED    INFLUENZA B NOT DETECTED       EKG:   Normal sinus rhythmNormal ECGWhen compared with ECG of 24-NOV-2020 18:40,No significant change was foundConfirmed by Danilo Pineda MD, [address] ([id number]) on 12/17/2021 7:24:22 AM    RADIOLOGY  CT ABDOMEN PELVIS W IV CONTRAST Additional Contrast? None   Final Result   Addendum 1 of 1   ADDENDUM:   Addendum being added for the purposes of clarification regarding    laterality.    The correct area of acute renal infarct is within the right kidney as   discussed in the body of the report, though inadvertently discussed as the left side in the impression. Final   Acute appearing infarct within the left kidney involving the upper pole as   well as the interpolar region. No other acute process seen within the abdomen or pelvis. Hysterectomy and cholecystectomy. RECOMMENDATIONS:   Unavailable                  Active Problems:    Abdominal pain    Renal infarct (HCC)    Hypoxia    PAF (paroxysmal atrial fibrillation) (HCC)  Resolved Problems:    * No resolved hospital problems. *        ASSESSMENT/PLAN:    Acute Right Renal Infarct  - CT as above  - vascular surgery was consulted in ED and recommended to start Eliquis or Xarelto and outpatient follow up with vascular surgery  - start Eliquis 10 mg BID x7 days then 5 mg BID thereafter   - echo ordered- pending   - admit to PCU with telemetry and continuous pulse ox    Hypoxia  - noted while sleeping, placed on 2 liter n/c  - suspect related to IV Dilaudid  - change to PO pain mediation  - continue to monitor  - continuous pulse ox    PAF  - currenlty in NSR  - s/p ablation  - recent holter monitor in 1/21 showed no PAF    Hypomagnesemia   - 1.5 replaced in ED  - BMP ordered today     HTN  - home medication to be verified     Hypothyroidism  - continue home dose of synthroid     Depression   - will order home medications once verifued    Medication Induced Cirrhosis  - on  urosodiol 250 mg BID, continue     Medications need to be verified       DVT Prophylaxis: Lovenox   Diet: ADULT DIET;  Regular  Code Status: Full Code    Kinsey Bill, APRN - CNP

## 2021-12-17 NOTE — ED NOTES
Pt resting in bed with both eyes closed. Respiration e/u. No acute distress noted.      Sherrie Hooks, TERRANCE  12/17/21 6936

## 2021-12-17 NOTE — ACP (ADVANCE CARE PLANNING)
Advance Care Planning   Healthcare Decision Maker:    Primary Decision Maker: Cordell Rina Ryan Child - [phone]    Click here to complete Healthcare Decision Makers including selection of the Healthcare Decision Maker Relationship (ie \"Primary\").

## 2021-12-17 NOTE — PLAN OF CARE
Renal infarct  Hx of PAF s/p MARTELL ablation in 10  Echo, pain control  Monitor on telemetry  Follows outpatient with cardiology

## 2021-12-17 NOTE — PROGRESS NOTES
Patient admitted to room 306-1 from ED. Patient oriented to room, call light, bed rails, phone, lights and bathroom. Patient instructed about the schedule of the day including: vital sign frequency, lab draws, possible tests, frequency of MD and staff rounds, daily weights, I &O's and prescribed diet. bed alarm in place, patient aware of placement and reason. Telemetry box in place, patient aware of placement and reason. Bed locked, in lowest position, side rails up 2/4, call light within reach. Recliner Assessment  Patient is able to demonstrate the ability to move from a reclining position to an upright position within the recliner. 4 Eyes Skin Assessment     The patient is being assess for   Admission    I agree that 2 RN's have performed a thorough Head to Toe Skin Assessment on the patient. ALL assessment sites listed below have been assessed. Areas assessed for pressure by both nurses:   [x]   Head, Face, and Ears   [x]   Shoulders, Back, and Chest, Abdomen  [x]   Arms, Elbows, and Hands   [x]   Coccyx, Sacrum, and Ischium  [x]   Legs, Feet, and Heels    No skin issues         Skin Assessed Under all Medical Devices by both nurses:  O2 device tubing              All Mepilex Borders were peeled back and area peeked at by both nurses:  No: no mepilexs   Please list where Mepilex Borders are located:  N/A             **SHARE this note so that the co-signing nurse is able to place an eSignature**    Co-signer eSignature: Electronically signed by Angel Ledbetter RN on 12/17/21 at 3:22 PM EST    Does the Patient have Skin Breakdown related to pressure?   No              Jose Prevention initiated:  No   Wound Care Orders initiated:  No      [facility] nurse consulted for Pressure Injury (Stage 3,4, Unstageable, DTI, NWPT, Complex wounds)and New or Established Ostomies:  No      Primary Nurse eSignature: Electronically signed by Skylar Ruiz RN on 12/17/21 at 1:36 PM EST

## 2021-12-17 NOTE — ED PROVIDER NOTES
I did not personally evaluate the patient nor was I involved in their care. However the EKG if interpreted as follows: The Ekg interpreted by me shows  normal sinus rhythm with a rate of 77  Axis is   Normal  QTc is  within an acceptable range  Intervals and Durations are unremarkable.       ST Segments: normal           Barbra Fletcher DO  12/16/21 3489

## 2021-12-17 NOTE — ED PROVIDER NOTES
[address] ED  EMERGENCY DEPARTMENT ENCOUNTER        Pt Name: Consuelo Funez  MRN: [id number]  [place] 1940  Date of evaluation: 12/16/2021  Provider: Mayur Odell PA-C  PCP: Lulu Rizo MD    Shared Visit or Autonomous Visit:  I have seen and evaluated this patient with my supervising physician Lorri Jacome MD.    [address] [place] [address]       Chief Complaint   Patient presents with    Abdominal Pain     starting today, feels nauseous.  Nausea       HISTORY OF PRESENT ILLNESS   (Location/Symptom, Timing/Onset, Context/Setting, Quality, Duration, Modifying Factors, Severity)  Note limiting factors. Consuelo Funez is a 80 y.o. female presenting to the emergency department for evaluation of lower abdominal pain mainly on the right lower abdomen that started today has been constant sharp pains and crampy pains and having nausea. No vomiting. No diarrhea or constipation. No fevers. Denies any chest pain or shortness of breath. No urinary symptoms. States she had similar symptoms in the past was diagnosed with colitis. The history is provided by the patient. Abdominal Pain  Pain location:  RLQ  Pain quality: cramping and sharp    Pain radiates to:  Does not radiate  Onset quality:  Sudden  Timing:  Constant  Progression:  Worsening  Chronicity:  New  Relieved by:  Nothing  Worsened by:  Palpation  Associated symptoms: nausea    Associated symptoms: no chest pain, no constipation, no cough, no diarrhea, no dysuria, no fever, no shortness of breath and no vomiting          Nursing Notes were reviewed    REVIEW OF SYSTEMS    (2-9 systems for level 4, 10 or more for level 5)     Review of Systems   Constitutional: Negative for fever. Respiratory: Negative for cough and shortness of breath. Cardiovascular: Negative for chest pain. Gastrointestinal: Positive for abdominal pain and nausea. Negative for constipation, diarrhea and vomiting.    Genitourinary: Negative for difficulty urinating and dysuria. All other systems reviewed and are negative. Positives and Pertinent negatives as per HPI. PAST MEDICAL HISTORY     Past Medical History:   Diagnosis Date    Atrial fibrillation ([address]     Depression          SURGICAL HISTORY     Past Surgical History:   Procedure Laterality Date    ABLATION OF DYSRHYTHMIC FOCUS      CHOLECYSTECTOMY      COLONOSCOPY N/A 3/26/2021    COLONOSCOPY POLYPECTOMY ABLATION performed by Familia Phillips MD at [facility]      right knee    UPPER GASTROINTESTINAL ENDOSCOPY N/A 3/26/2021    EGD BIOPSY performed by Familia Phillips MD at [address]       Previous Medications    VITAMIN D (CHOLECALCIFEROL) 1000 UNITS CAPS CAPSULE    Take 1,000 Units by mouth 2 times daily (with meals). ALLERGIES     Quinidex [quinidine]    FAMILYHISTORY     History reviewed. No pertinent family history. SOCIAL HISTORY       Social History     Socioeconomic History    Marital status:      Spouse name: None    Number of children: None    Years of education: None    Highest education level: None   Occupational History    None   Tobacco Use    Smoking status: Never Smoker    Smokeless tobacco: Never Used   Vaping Use    Vaping Use: Never used   Substance and Sexual Activity    Alcohol use: Never    Drug use: Never    Sexual activity: None   Other Topics Concern    None   Social History Narrative    None     Social Determinants of Health     Financial Resource Strain:     Difficulty of Paying Living Expenses: Not on file   Food Insecurity:     Worried About Running Out of Food in the Last Year: Not on file    Jaqueline of Food in the Last Year: Not on file   Transportation Needs:     Lack of Transportation (Medical): Not on file    Lack of Transportation (Non-Medical):  Not on file   Physical Activity:     Days of Exercise per Week: Not on file    Minutes of Exercise per Session: Not on file   Stress:     Feeling of Stress : Not on file   Social Connections:     Frequency of Communication with Friends and Family: Not on file    Frequency of Social Gatherings with Friends and Family: Not on file    Attends Quaker Services: Not on file    Active Member of [address] [place] [address] or Organizations: Not on file    Attends Club or Organization Meetings: Not on file    Marital Status: Not on file   Intimate Partner Violence:     Fear of Current or Ex-Partner: Not on file    Emotionally Abused: Not on file    Physically Abused: Not on file    Sexually Abused: Not on file   Housing Stability:     Unable to Pay for Housing in the Last Year: Not on file    Number of [place] in the Last Year: Not on file    Unstable Housing in the Last Year: Not on file       SCREENINGS    [place] Coma Scale  Eye Opening: Spontaneous  Best Verbal Response: Oriented  Best Motor Response: Obeys commands  [place] Coma Scale Score: 15        PHYSICAL EXAM    (up to 7 for level 4, 8 or more for level 5)     ED Triage Vitals [12/16/21 2039]   BP Temp Temp Source Pulse Resp SpO2 Height Weight   (!) 177/90 99.2 °F (37.3 °C) Temporal 81 18 95 % -- 210 lb (95.3 kg)       Physical Exam  Vitals and nursing note reviewed. Constitutional:       Appearance: She is well-developed. She is not toxic-appearing. Comments: Appears uncomfortable   HENT:      Head: Normocephalic and atraumatic. Mouth/Throat:      Mouth: Mucous membranes are moist.      Pharynx: Oropharynx is clear. No pharyngeal swelling, oropharyngeal exudate or posterior oropharyngeal erythema. Eyes:      Conjunctiva/sclera: Conjunctivae normal.      Pupils: Pupils are equal, round, and reactive to light. Neck:      Vascular: No JVD. Cardiovascular:      Rate and Rhythm: Normal rate and regular rhythm. Pulmonary:      Effort: Pulmonary effort is normal. No respiratory distress. Breath sounds: Normal breath sounds.  No stridor. No wheezing, rhonchi or rales. Abdominal:      General: Bowel sounds are normal. There is no distension. Palpations: Abdomen is soft. Abdomen is not rigid. There is no mass. Tenderness: There is abdominal tenderness in the right lower quadrant. There is guarding. There is no rebound. Musculoskeletal:         General: Normal range of motion. Cervical back: Normal range of motion and neck supple. Skin:     General: Skin is warm and dry. Findings: No rash. Neurological:      Mental Status: She is alert and oriented to person, place, and time. GCS: GCS eye subscore is 4. GCS verbal subscore is 5. GCS motor subscore is 6. Cranial Nerves: No cranial nerve deficit. Sensory: No sensory deficit. Motor: No abnormal muscle tone.       Coordination: Coordination normal.   Psychiatric:         Behavior: Behavior normal.         DIAGNOSTIC RESULTS   LABS:    Labs Reviewed   CBC WITH AUTO DIFFERENTIAL - Abnormal; Notable for the following components:       Result Value    Neutrophils Absolute 8.2 (*)     All other components within normal limits    Narrative:     Performed at:  [facility],  [place], [facility]   Phone [phone]   COMPREHENSIVE METABOLIC PANEL W/ REFLEX TO MG FOR LOW K - Abnormal; Notable for the following components:    Glucose 129 (*)     GFR Non- 43 (*)     GFR  52 (*)     All other components within normal limits    Narrative:     Performed at:  [facility] - [facility],  [place], [facility]   Phone [phone]   URINE RT REFLEX TO CULTURE - Abnormal; Notable for the following components:    Ketones, Urine TRACE (*)     Protein, UA TRACE (*)     Leukocyte Esterase, Urine SMALL (*)     All other components within normal limits    Narrative:     Performed at:  [facility] - [facility], [place], [place]   Phone [phone]   MAGNESIUM - Abnormal; Notable for the following components:    Magnesium 1.50 (*)     All other components within normal limits    Narrative:     Performed at:  [facility],  [place], [place]   Phone [phone]   MICROSCOPIC URINALYSIS - Abnormal; Notable for the following components:    RBC, UA 5-10 (*)     Bacteria, UA Rare (*)     All other components within normal limits    Narrative:     Performed at:  [place] [address],  [place]   Phone [phone] & INFLUENZA COMBO   LIPASE    Narrative:     Performed at:  [place] [address],  [place]   Phone [phone]   LACTATE, SEPSIS    Narrative:     Performed at:  [place] [address],  [place]   Phone [phone]   TROPONIN    Narrative:     Performed at:  [place] [address],  [place]   Phone [phone]   LACTATE, SEPSIS     Results for orders placed or performed during the hospital encounter of 12/16/21   CBC Auto Differential   Result Value Ref Range    WBC 10.2 4.0 - 11.0 K/uL    RBC 4.72 4.00 - 5.20 M/uL    Hemoglobin 14.5 12.0 - 16.0 g/dL    Hematocrit 41.5 36.0 - 48.0 %    MCV 87.9 80.0 - 100.0 fL    MCH 30.8 26.0 - 34.0 pg    MCHC 35.0 31.0 - 36.0 g/dL    RDW 13.0 12.4 - 15.4 %    Platelets 429 516 - 595 K/uL    MPV 7.4 5.0 - 10.5 fL    Neutrophils % 80.9 %    Lymphocytes % 12.1 %    Monocytes % 6.1 %    Eosinophils % 0.3 %    Basophils % 0.6 %    Neutrophils Absolute 8.2 (H) 1.7 - 7.7 K/uL    Lymphocytes Absolute 1.2 1.0 - 5.1 K/uL    Monocytes Absolute 0.6 0.0 - 1.3 K/uL    Eosinophils Absolute 0.0 0.0 - 0.6 K/uL    Basophils Absolute 0.1 0.0 - 0.2 K/uL   Comprehensive Metabolic Panel w/ Reflex to MG   Result Value Ref Range    Sodium 136 136 - 145 mmol/L    Potassium reflex Magnesium 3.5 3.5 - 5.1 mmol/L    Chloride 100 99 - 110 mmol/L    CO2 23 21 - 32 mmol/L    Anion Gap 13 3 - 16    Glucose 129 (H) 70 - 99 mg/dL    BUN 17 7 - 20 mg/dL    CREATININE 1.2 0.6 - 1.2 mg/dL    GFR Non- 43 (A) >60    GFR  52 (A) >60    Calcium 9.4 8.3 - 10.6 mg/dL    Total Protein 7.3 6.4 - 8.2 g/dL    Albumin 4.0 3.4 - 5.0 g/dL    Albumin/Globulin Ratio 1.2 1.1 - 2.2    Total Bilirubin 0.7 0.0 - 1.0 mg/dL    Alkaline Phosphatase 129 40 - 129 U/L    ALT 12 10 - 40 U/L    AST 26 15 - 37 U/L   Lipase   Result Value Ref Range    Lipase 15.0 13.0 - 60.0 U/L   Urinalysis Reflex to Culture    Specimen: Urine, clean catch   Result Value Ref Range    Color, UA Yellow Straw/Yellow    Clarity, UA Clear Clear    Glucose, Ur Negative Negative mg/dL    Bilirubin Urine Negative Negative    Ketones, Urine TRACE (A) Negative mg/dL    Specific Gravity, UA 1.020 1.005 - 1.030    Blood, Urine Negative Negative    pH, UA 6.0 5.0 - 8.0    Protein, UA TRACE (A) Negative mg/dL    Urobilinogen, Urine 1.0 <2.0 E.U./dL    Nitrite, Urine Negative Negative    Leukocyte Esterase, Urine SMALL (A) Negative    Microscopic Examination YES     Urine Type NotGiven     Urine Reflex to Culture Not Indicated    Lactate, Sepsis   Result Value Ref Range    Lactic Acid, Sepsis 1.1 0.4 - 1.9 mmol/L   Troponin   Result Value Ref Range    Troponin <0.01 <0.01 ng/mL   Magnesium   Result Value Ref Range    Magnesium 1.50 (L) 1.80 - 2.40 mg/dL   Microscopic Urinalysis   Result Value Ref Range    WBC, UA 3-5 0 - 5 /HPF    RBC, UA 5-10 (A) 0 - 4 /HPF    Bacteria, UA Rare (A) None Seen /HPF   EKG 12 Lead   Result Value Ref Range    Ventricular Rate 77 BPM    Atrial Rate 77 BPM    P-R Interval 156 ms    QRS Duration 78 ms    Q-T Interval 380 ms    QTc Calculation (Bazett) 430 ms    P Axis 47 degrees    R Axis -5 degrees    T Axis 30 degrees    Diagnosis Normal sinus rhythmNormal ECGWhen compared with ECG of 24-NOV-2020 18:40,No significant change was found         All other labs were within normal range or not returned as of this dictation. EKG: All EKG's are interpreted by the Emergency Department Physician in the absence of a cardiologist.  Please see their note for interpretation of EKG. RADIOLOGY:   Non-plain film images such as CT, Ultrasound and MRI are read by the radiologist. Plain radiographic images are visualized andpreliminarily interpreted by the  ED Provider with the below findings:        Interpretation perthe Radiologist below, if available at the time of this note:    CT ABDOMEN PELVIS W IV CONTRAST Additional Contrast? None   Final Result   Addendum 1 of 1   ADDENDUM:   Addendum being added for the purposes of clarification regarding    laterality. The correct area of acute renal infarct is within the right kidney as   discussed in the body of the report, though inadvertently discussed as the   left side in the impression. Final   Acute appearing infarct within the left kidney involving the upper pole as   well as the interpolar region. No other acute process seen within the abdomen or pelvis. Hysterectomy and cholecystectomy. RECOMMENDATIONS:   Unavailable              CT ABDOMEN PELVIS W IV CONTRAST Additional Contrast? None    Addendum Date: 12/16/2021    ADDENDUM: Addendum being added for the purposes of clarification regarding laterality. The correct area of acute renal infarct is within the right kidney as discussed in the body of the report, though inadvertently discussed as the left side in the impression. Result Date: 12/16/2021  EXAMINATION: CT OF THE ABDOMEN AND PELVIS WITH CONTRAST 12/16/2021 10:13 pm TECHNIQUE: CT of the abdomen and pelvis was performed with the administration of intravenous contrast. Multiplanar reformatted images are provided for review.  Dose modulation, iterative reconstruction, and/or weight based adjustment of the mA/kV was utilized to reduce the radiation dose to as low as reasonably achievable. COMPARISON: 09/21/2020 HISTORY: ORDERING SYSTEM PROVIDED HISTORY: lower abd pain TECHNOLOGIST PROVIDED HISTORY: Reason for exam:->lower abd pain Additional Contrast?->None Decision Support Exception - unselect if not a suspected or confirmed emergency medical condition->Emergency Medical Condition (MA) Reason for Exam: lower abdominal pain FINDINGS: Lower Chest: Calcified granuloma seen in the right lower lobe. Minimal dependent changes are seen within the lung bases. No cardiomegaly is identified. No hiatal hernia is identified. Organs: No enhancing or hypodense mass identified in the liver or spleen. No pancreatic mass is identified. No peripancreatic inflammatory process. Nonobstructive calculi identified within the left kidney, with no hydroureteronephrosis. There wedge-shaped areas of perfusion defect seen within the interpolar region of the right kidney and upper pole. GI/Bowel: No ileus or obstruction is identified. No acute inflammatory bowel process is identified. Pelvis: No pelvic mass is identified. Hysterectomy. The bladder is decompressed. No pelvic mass is identified. No free fluid. No bulky pelvic lymphadenopathy is identified. Peritoneum/Retroperitoneum: No abdominal aortic aneurysm is identified. No retroperitoneal lymphadenopathy. Atherosclerotic plaque is identified. Bones/Soft Tissues: No acute subcutaneous soft tissue abnormality is identified. Similar appearing probable bone islands noted in the pubic symphysis as well as right iliac bone along the superior aspect of the acetabulum. Multilevel degenerative changes are seen within the spine. Degenerative changes appear predominantly moderate in amount, with osseous foraminal and spinal canal narrowing, though not severe.      Acute appearing infarct within the left kidney involving the upper pole as well as the interpolar region. No other acute process seen within the abdomen or pelvis. Hysterectomy and cholecystectomy. RECOMMENDATIONS: Unavailable         PROCEDURES   Unless otherwise noted below, none     Procedures    CRITICAL CARE TIME   Critical care provided for this patient of which 10 min were spend on critical care and decision making. 0 min spent on procedures including. There was imminent failure of an organ system which required critical intervention to prevent clinically significant progression of life threatening deterioration of the patient's condition to the point of disability or death. CONSULTS:  IP CONSULT TO VASCULAR SURGERY  IP CONSULT TO HOSPITALIST      EMERGENCY DEPARTMENT COURSE and DIFFERENTIAL DIAGNOSIS/MDM:   Vitals:    Vitals:    12/16/21 2340 12/16/21 2350 12/17/21 0000 12/17/21 0010   BP:   (!) 185/86    Pulse:   73    Resp:   18    Temp:       TempSrc:       SpO2: 95% 94% 90% 97%   Weight:           Patient was given thefollowing medications:  Medications   HYDROmorphone (DILAUDID) injection 1 mg (1 mg IntraVENous Given 12/16/21 2258)   ondansetron (ZOFRAN) injection 4 mg (4 mg IntraVENous Given 12/16/21 2302)   morphine (PF) injection 2 mg (2 mg IntraVENous Given 12/16/21 2119)   ondansetron (ZOFRAN) injection 4 mg (4 mg IntraVENous Given 12/16/21 2119)   iopamidol (ISOVUE-370) 76 % injection 75 mL (75 mLs IntraVENous Given 12/16/21 2213)   0.9 % sodium chloride bolus (1,000 mLs IntraVENous New Bag 12/16/21 2239)   magnesium sulfate 1000 mg in dextrose 5% 100 mL IVPB (0 mg IntraVENous Stopped 12/17/21 0041)       ED course  Patient presented to the ER for evaluation of right lower abdominal pain started suddenly this afternoon. She appears uncomfortable on exam tenderness in the right lower abdomen. Was provided pain medication work-up was obtained. CT scan showing acute infarct of the right kidney. Vascular surgery was consulted please see Dr. Nuñze Manifold note.   Patient will be admitted to the hospital. Patient is stable. FINAL IMPRESSION      1. Kidney infarction ([address]    2. Abdominal pain, unspecified abdominal location    3. Nausea          DISPOSITION/PLAN   DISPOSITION     PATIENT REFERREDTO:  No follow-up provider specified.     DISCHARGE MEDICATIONS:  New Prescriptions    No medications on file       DISCONTINUED MEDICATIONS:  Discontinued Medications    DICYCLOMINE (BENTYL) 20 MG TABLET    Take 1 tablet by mouth 3 times daily as needed (abdominal spasms)    ONDANSETRON (ZOFRAN-ODT) 4 MG DISINTEGRATING TABLET    Take 1 tablet by mouth 3 times daily as needed for Nausea or Vomiting              (Please note that portions ofthis note were completed with a voice recognition program.  Efforts were made to edit the dictations but occasionally words are mis-transcribed.)    Tae Larson PA-C (electronically signed)            Dennis Saunders PA-C  12/17/21 0101

## 2021-12-17 NOTE — ED PROVIDER NOTES
[address] [facility] ED  [address] [place] [address]  Dept: [phone]  Loc: [phone]    Open Note Time:  11:48 PM EST    I independently performed a history and physical on [address] Chief Complaint  Abdominal Pain (starting today, feels nauseous. ) and Nausea       This is a very pleasant 80 y.o. female  who was evaluated in the emergency department for right sided abdominal pain, described as a squeezing sensation. Sudden onset at 10 AM.  She is also had associated nausea. Unless otherwise stated in this report or unable to obtain because of the patient's clinical or mental status as evidenced by the medical record, this patient's positive and negative responses for review of systems, constitutional, psych, eyes, ENT, cardiovascular, respiratory, gastrointestinal, neurological, genitourinary, musculoskeletal, integument systems and systems related to the presenting problem are either stated in the preceding paragraph or were not pertinent or were negative for the symptoms and/or complaints related to the medical problem. I wore goggles, surgical mask, N95 mask and gloves when I evaluated the patient. Focused exam:  Body mass index is 33.89 kg/m². The physical exam reveals an alert and oriented patient who does not appear to be confused, non-ill-appearing, no abnormal heart sounds, no abnormal lung sounds, speaking comfortably in full sentences, palpation over the right abdomen does not reproduce any pain, the pain remained constant. EKG  The Ekg interpreted by me shows  normal sinus rhythm with a rate of 77  Axis is   Normal  QTc is  within an acceptable range  Intervals and Durations are unremarkable. ST Segments: no acute change  Delta waves, Brugada Syndrome, and Short IL are not present. Prior EKG to compare with was available.  No significant changes compared to prior EKG from Nov 24 2020    Brief ED course/MDM: Procedures/interventions/images ordered for this visit  Orders Placed This Encounter   Procedures    CT ABDOMEN PELVIS W IV CONTRAST Additional Contrast? None    CBC Auto Differential    Comprehensive Metabolic Panel w/ Reflex to MG    Lipase    Urinalysis Reflex to Culture    Lactate, Sepsis    Troponin    Magnesium    Microscopic Urinalysis    Inpatient consult to Vascular Surgery    Inpatient consult to Hospitalist    EKG 12 Lead    Saline lock IV       Medications ordered for this visit  Orders Placed This Encounter   Medications    morphine (PF) injection 2 mg    ondansetron (ZOFRAN) injection 4 mg    iopamidol (ISOVUE-370) 76 % injection 75 mL    0.9 % sodium chloride bolus    HYDROmorphone (DILAUDID) injection 1 mg    ondansetron (ZOFRAN) injection 4 mg    magnesium sulfate 1000 mg in dextrose 5% 100 mL IVPB       ED course notes for this visit       11:49 PM EST    Vascular consult with Dr. Radha Landa. Discussed ED course, lab results, imaging, and ED course with physical exam.  Consultant recommends anticoagulation with either Eliquis or Xarelto and follow-up with vascular surgery. Patient seen and evaluated in room 07/07    Advanced [address] [facility] is a 80 y.o. female  has a past medical history of Atrial fibrillation ([address] and Depression. with stable vital signs except elevated blood pressure reading. I ordered lab draws, imaging, pain control, IV fluids  I interpreted lab results, CT results    Based on above studies,  admission/transfer is recommended        Final Impression  1. Kidney infarction [facility])    2. Abdominal pain, unspecified abdominal location    3. Nausea        Blood pressure (!) 177/84, pulse 75, temperature 99.2 °F (37.3 °C), temperature source Temporal, resp. rate 22, weight 210 lb (95.3 kg), SpO2 91 %.     Disposition  Patient understands that they are going to be admitted to the hospital.  There was shared decision making between myself as well as the patient and/or their surrogate and we are in agreement with admission. There is an opportunity for questions and all questions answered to the best of my ability and to the satisfaction of the patient and/or patient's family. Pt is in stable condition upon Admit to med/surg floor. All diagnostic, treatment, and disposition decisions were made by myself in conjunction with the ELENA/resident. For further details of the patient's emergency department visit, please see ELENA/resident documentation. Initial history and physical exam information was obtained by the ELENA/resident, also dictated a record of this visit. I independently examined and evaluated this patient and participated in the diagnostic, treatment and disposition decisions. Pt was seen during the [place] 19 pandemic. Appropriate PPE worn by this writer during patient encounters. Pt seen during a time with constrained hospital bed capacity and other potential inpatient and outpatient resources were constrained due to the viral pandemic. The note was completed using Dragon voice recognition transcription. Every effort was made to ensure accuracy; however, inadvertent transcription errors may be present despite my best efforts to edit errors.     Jorge L Rose MD  [address] MD [address]ta  12/17/21 1531 Alice Ocampo MD  12/17/21 [id number]

## 2021-12-18 LAB
ANION GAP SERPL CALCULATED.3IONS-SCNC: 18 MMOL/L (ref 3–16)
BASOPHILS ABSOLUTE: 0.2 K/UL (ref 0–0.2)
BASOPHILS RELATIVE PERCENT: 1.3 %
BUN BLDV-MCNC: 16 MG/DL (ref 7–20)
CALCIUM SERPL-MCNC: 8.9 MG/DL (ref 8.3–10.6)
CHLORIDE BLD-SCNC: 100 MMOL/L (ref 99–110)
CO2: 16 MMOL/L (ref 21–32)
CREAT SERPL-MCNC: 1.3 MG/DL (ref 0.6–1.2)
EOSINOPHILS ABSOLUTE: 0 K/UL (ref 0–0.6)
EOSINOPHILS RELATIVE PERCENT: 0 %
GFR AFRICAN AMERICAN: 48
GFR NON-AFRICAN AMERICAN: 39
GLUCOSE BLD-MCNC: 95 MG/DL (ref 70–99)
HCT VFR BLD CALC: 38.3 % (ref 36–48)
HEMOGLOBIN: 12.7 G/DL (ref 12–16)
LYMPHOCYTES ABSOLUTE: 1.2 K/UL (ref 1–5.1)
LYMPHOCYTES RELATIVE PERCENT: 7.5 %
MAGNESIUM: 1.9 MG/DL (ref 1.8–2.4)
MCH RBC QN AUTO: 30.1 PG (ref 26–34)
MCHC RBC AUTO-ENTMCNC: 33.1 G/DL (ref 31–36)
MCV RBC AUTO: 91.2 FL (ref 80–100)
MONOCYTES ABSOLUTE: 1.2 K/UL (ref 0–1.3)
MONOCYTES RELATIVE PERCENT: 7.2 %
NEUTROPHILS ABSOLUTE: 13.9 K/UL (ref 1.7–7.7)
NEUTROPHILS RELATIVE PERCENT: 84 %
PDW BLD-RTO: 13 % (ref 12.4–15.4)
PLATELET # BLD: 185 K/UL (ref 135–450)
PMV BLD AUTO: 7 FL (ref 5–10.5)
POTASSIUM REFLEX MAGNESIUM: 3.9 MMOL/L (ref 3.5–5.1)
PROCALCITONIN: 0.61 NG/ML (ref 0–0.15)
RBC # BLD: 4.2 M/UL (ref 4–5.2)
SODIUM BLD-SCNC: 134 MMOL/L (ref 136–145)
WBC # BLD: 16.6 K/UL (ref 4–11)

## 2021-12-18 PROCEDURE — 6360000002 HC RX W HCPCS: Performed by: INTERNAL MEDICINE

## 2021-12-18 PROCEDURE — 36415 COLL VENOUS BLD VENIPUNCTURE: CPT

## 2021-12-18 PROCEDURE — 84145 PROCALCITONIN (PCT): CPT

## 2021-12-18 PROCEDURE — 2580000003 HC RX 258: Performed by: INTERNAL MEDICINE

## 2021-12-18 PROCEDURE — 94761 N-INVAS EAR/PLS OXIMETRY MLT: CPT

## 2021-12-18 PROCEDURE — 80048 BASIC METABOLIC PNL TOTAL CA: CPT

## 2021-12-18 PROCEDURE — 2060000000 HC ICU INTERMEDIATE R&B

## 2021-12-18 PROCEDURE — 83735 ASSAY OF MAGNESIUM: CPT

## 2021-12-18 PROCEDURE — 6370000000 HC RX 637 (ALT 250 FOR IP): Performed by: INTERNAL MEDICINE

## 2021-12-18 PROCEDURE — 6370000000 HC RX 637 (ALT 250 FOR IP): Performed by: NURSE PRACTITIONER

## 2021-12-18 PROCEDURE — 96376 TX/PRO/DX INJ SAME DRUG ADON: CPT

## 2021-12-18 PROCEDURE — 99232 SBSQ HOSP IP/OBS MODERATE 35: CPT | Performed by: INTERNAL MEDICINE

## 2021-12-18 PROCEDURE — 87040 BLOOD CULTURE FOR BACTERIA: CPT

## 2021-12-18 PROCEDURE — 2700000000 HC OXYGEN THERAPY PER DAY

## 2021-12-18 PROCEDURE — 85025 COMPLETE CBC W/AUTO DIFF WBC: CPT

## 2021-12-18 RX ADMIN — LEVOTHYROXINE SODIUM 50 MCG: 25 TABLET ORAL at 05:47

## 2021-12-18 RX ADMIN — OXYCODONE HYDROCHLORIDE AND ACETAMINOPHEN 1 TABLET: 5; 325 TABLET ORAL at 18:14

## 2021-12-18 RX ADMIN — ONDANSETRON HYDROCHLORIDE 4 MG: 2 INJECTION, SOLUTION INTRAMUSCULAR; INTRAVENOUS at 21:11

## 2021-12-18 RX ADMIN — URSODIOL 300 MG: 300 CAPSULE ORAL at 21:11

## 2021-12-18 RX ADMIN — CEFTRIAXONE SODIUM 1000 MG: 1 INJECTION, POWDER, FOR SOLUTION INTRAMUSCULAR; INTRAVENOUS at 11:22

## 2021-12-18 RX ADMIN — SODIUM CHLORIDE, PRESERVATIVE FREE 10 ML: 5 INJECTION INTRAVENOUS at 07:53

## 2021-12-18 RX ADMIN — APIXABAN 10 MG: 5 TABLET, FILM COATED ORAL at 21:11

## 2021-12-18 RX ADMIN — TRAMADOL HYDROCHLORIDE 50 MG: 50 TABLET ORAL at 21:10

## 2021-12-18 RX ADMIN — URSODIOL 300 MG: 300 CAPSULE ORAL at 07:53

## 2021-12-18 RX ADMIN — ACETAMINOPHEN 650 MG: 325 TABLET ORAL at 08:00

## 2021-12-18 RX ADMIN — APIXABAN 10 MG: 5 TABLET, FILM COATED ORAL at 07:53

## 2021-12-18 RX ADMIN — SODIUM CHLORIDE, PRESERVATIVE FREE 10 ML: 5 INJECTION INTRAVENOUS at 21:11

## 2021-12-18 RX ADMIN — SERTRALINE HYDROCHLORIDE 50 MG: 50 TABLET ORAL at 07:53

## 2021-12-18 ASSESSMENT — PAIN DESCRIPTION - PAIN TYPE
TYPE: ACUTE PAIN

## 2021-12-18 ASSESSMENT — PAIN DESCRIPTION - LOCATION
LOCATION: HEAD
LOCATION: BACK
LOCATION: HEAD

## 2021-12-18 ASSESSMENT — PAIN SCALES - GENERAL
PAINLEVEL_OUTOF10: 6
PAINLEVEL_OUTOF10: 7
PAINLEVEL_OUTOF10: 3

## 2021-12-18 ASSESSMENT — PAIN DESCRIPTION - DESCRIPTORS
DESCRIPTORS: HEADACHE
DESCRIPTORS: ACHING
DESCRIPTORS: DULL;ACHING

## 2021-12-18 NOTE — FLOWSHEET NOTE
12/18/21 0730   Vital Signs   Temp 100 °F (37.8 °C)   Temp Source Oral   Pulse 70   Heart Rate Source Monitor   Resp 18   /66   Patient Position Semi fowlers   Level of Consciousness Alert (0)   MEWS Score 1   Oxygen Therapy   SpO2 97 %   O2 Device Nasal cannula   O2 Flow Rate (L/min) 1 L/min   Pt. Resting in bed. Call light in reach. Shift assessment completed see flow sheet. Denies any needs at this time. Will continue to monitor.

## 2021-12-18 NOTE — PROGRESS NOTES
Pt resting in bed no s/s of distress. meds given per [facility]. Pt denying any needs at this time, bed in lowest, call light within reach.  Satinder Nicholson RN

## 2021-12-18 NOTE — PROGRESS NOTES
Progress Note    Admit Date:  12/16/2021    Patient presented with complaints of abdominal pain . CT abdomen showed renal infarct, patient started on anticoagulation with Eliquis. .         Subjective:  Ms. Tod Watson continues to complain of abdominal pain  On the  right side. CT abdomen report has been addended patient actually has a right renal infarct . did not show any other acute intra-abdominal pathology . patient has had a cholecystectomy. Patient still has some nausea also. In addition she started spiking temperatures. T-max overnight 101 °F , procalcitonin elevated. White count slightly elevated. No clear etiology. patient denies any dysuria,  denies any cough or sputum production no diarrhea. Objective:   /66   Pulse 70   Temp 100 °F (37.8 °C) (Oral)   Resp 18   Wt 196 lb 9 oz (89.2 kg)   SpO2 97%   BMI 31.73 kg/m²     Intake/Output Summary (Last 24 hours) at 12/18/2021 1157  Last data filed at 12/18/2021 1043  Gross per 24 hour   Intake 160 ml   Output 450 ml   Net -290 ml       Physical Exam:  Gen: No distress. Alert. Eyes: PERRL. No sclera icterus. No conjunctival injection. ENT: No discharge. Pharynx clear. Neck: No JVD. Trachea midline. Resp: No accessory muscle use. No crackles. No wheezes. No rhonchi. CV: Regular rate. Regular rhythm. No murmur. No rub. No edema. GI: TTP right lowe/mid abdomen. Non-distended. No masses. No organomegaly. Normal bowel sounds. No hernia. Skin: Warm and dry. No nodule on exposed extremities. No rash on exposed extremities. M/S: No cyanosis. No joint deformity. No clubbing. Neuro: Awake. Grossly nonfocal    Psych: Oriented x 3.  No anxiety or agitation      Medications:   Scheduled Meds:   cefTRIAXone (ROCEPHIN) IV  1,000 mg IntraVENous Q24H    sodium chloride flush  5-40 mL IntraVENous 2 times per day    levothyroxine  50 mcg Oral Daily    sertraline  50 mg Oral Daily    apixaban  10 mg Oral BID    ursodiol  300 mg Oral BID       Continuous Infusions:   sodium chloride      sodium chloride 75 mL/hr at 12/17/21 1420       Data:  CBC:   Recent Labs     12/16/21 2043 12/17/21  1208 12/18/21  0717   WBC 10.2 17.6* 16.6*   RBC 4.72 4.40 4.20   HGB 14.5 13.2 12.7   HCT 41.5 39.7 38.3   MCV 87.9 90.2 91.2   RDW 13.0 13.0 13.0    201 185     BMP:   Recent Labs     12/16/21 2043 12/17/21  1208 12/18/21  0434    135* 134*   K 3.5 4.4 3.9    100 100   CO2 23 24 16*   BUN 17 15 16   CREATININE 1.2 1.3* 1.3*     BNP: No results for input(s): BNP in the last 72 hours. PT/INR: No results for input(s): PROTIME, INR in the last 72 hours. APTT: No results for input(s): APTT in the last 72 hours. CARDIAC ENZYMES:   Recent Labs     12/16/21 2043   TROPONINI <0.01     FASTING LIPID PANEL:No results found for: CHOL, HDL, TRIG  LIVER PROFILE:   Recent Labs     12/16/21 2043   AST 26   ALT 12   BILITOT 0.7   ALKPHOS 129        CULTURES    SARS-CoV-2 RNA, RT PCR NOT DETECTED           INFLUENZA A NOT DETECTED     INFLUENZA B NOT DETECTED         EKG:   Normal sinus rhythmNormal ECGWhen compared with ECG of 24-NOV-2020 18:40,No significant change was foundConfirmed by Geovanna Olsen MD, [address] ([id number]) on 12/17/2021 7:24:22 AM     RADIOLOGY      CT ABDOMEN PELVIS W IV CONTRAST Additional Contrast? None   Final Result   Addendum 1 of 1   ADDENDUM:   Addendum being added for the purposes of clarification regarding    laterality. The correct area of acute renal infarct is within the right kidney as   discussed in the body of the report, though inadvertently discussed as the   left side in the impression. Final   Acute appearing infarct within the left kidney involving the upper pole as   well as the interpolar region. No other acute process seen within the abdomen or pelvis. Hysterectomy and cholecystectomy. RECOMMENDATIONS:   Unavailable              Echocardiogram   Summary   Poor acoustic windows.  Left ventricular systolic function is normal with   ejection fraction estimated at 55%. No regional wall motion abnormalities. There is mild concentric left ventricular hypertrophy. Grade I diastolic dysfunction with normal left ventricular filling pressure. Systolic pulmonary artery pressure (SPAP) is normal estimated at 22 mmHg   (Right atrial pressure of 8 mmHg). No obvious valvular endocarditis. Consider DENNY if clinically endocarditis is suspected    Assessment:  Active Problems:    Abdominal pain    Renal infarct (HCC)    Hypoxia    PAF (paroxysmal atrial fibrillation) ([address]  Resolved Problems:    * No resolved hospital problems. *      Plan:     Acute Right Renal Infarct  - CT as above. Report has been addended to confirm right renal infarct  - vascular surgery was consulted in ED and recommended to start Eliquis or Xarelto and outpatient follow up with vascular surgery  - start Eliquis 10 mg BID x7 days then 5 mg BID thereafter   - echo reviewed and as above. - admit to PCU with telemetry and continuous pulse ox  Patient will need outpatient vascular surgery follow-up    Abdominal pain   -Secondary to renal infarct . Patient has had a cholecystectomy. CT abdomen did not show any other acute abnormality.   Plus    Elevated procalcitonin with leukocytosis   -source of infection unclear, possible UTI  we will check cultures and start empiric antibiotics with Rocephin    Hypoxia  - noted while sleeping, placed on 2 liter n/c  - suspect related to IV Dilaudid  - change to PO pain mediation  - continue to monitor  - continuous pulse ox     PAF  - jen in NSR  - s/p ablation  - recent holter monitor in 1/21 showed no PAF     Hypomagnesemia   - 1.5 replaced in ED  - BMP ordered today      HTN  - home medication to be verified      Hypothyroidism  - continue home dose of synthroid      Depression   - will order home medications once verifued     Medication Induced Cirrhosis  - on  urosodiol 250 mg BID, continue       DVT Prophylaxis: Lovenox   Diet: ADULT DIET;  Regular  Code Status: Full Code    Kia Felton MD, MD 12/18/2021 11:57 AM

## 2021-12-19 VITALS
TEMPERATURE: 99.1 F | SYSTOLIC BLOOD PRESSURE: 118 MMHG | BODY MASS INDEX: 31.75 KG/M2 | RESPIRATION RATE: 16 BRPM | OXYGEN SATURATION: 95 % | WEIGHT: 196.7 LBS | DIASTOLIC BLOOD PRESSURE: 73 MMHG | HEART RATE: 74 BPM

## 2021-12-19 LAB
ANION GAP SERPL CALCULATED.3IONS-SCNC: 9 MMOL/L (ref 3–16)
BASOPHILS ABSOLUTE: 0.1 K/UL (ref 0–0.2)
BASOPHILS RELATIVE PERCENT: 0.4 %
BUN BLDV-MCNC: 14 MG/DL (ref 7–20)
CALCIUM SERPL-MCNC: 8.5 MG/DL (ref 8.3–10.6)
CHLORIDE BLD-SCNC: 104 MMOL/L (ref 99–110)
CO2: 21 MMOL/L (ref 21–32)
CREAT SERPL-MCNC: 1.2 MG/DL (ref 0.6–1.2)
EOSINOPHILS ABSOLUTE: 0 K/UL (ref 0–0.6)
EOSINOPHILS RELATIVE PERCENT: 0.1 %
GFR AFRICAN AMERICAN: 52
GFR NON-AFRICAN AMERICAN: 43
GLUCOSE BLD-MCNC: 110 MG/DL (ref 70–99)
HCT VFR BLD CALC: 33 % (ref 36–48)
HEMOGLOBIN: 11.5 G/DL (ref 12–16)
LYMPHOCYTES ABSOLUTE: 1.2 K/UL (ref 1–5.1)
LYMPHOCYTES RELATIVE PERCENT: 9.4 %
MAGNESIUM: 1.7 MG/DL (ref 1.8–2.4)
MCH RBC QN AUTO: 30.8 PG (ref 26–34)
MCHC RBC AUTO-ENTMCNC: 35 G/DL (ref 31–36)
MCV RBC AUTO: 87.9 FL (ref 80–100)
MONOCYTES ABSOLUTE: 0.9 K/UL (ref 0–1.3)
MONOCYTES RELATIVE PERCENT: 6.5 %
NEUTROPHILS ABSOLUTE: 11 K/UL (ref 1.7–7.7)
NEUTROPHILS RELATIVE PERCENT: 83.6 %
PDW BLD-RTO: 13 % (ref 12.4–15.4)
PLATELET # BLD: 189 K/UL (ref 135–450)
PMV BLD AUTO: 7.4 FL (ref 5–10.5)
POTASSIUM REFLEX MAGNESIUM: 3.5 MMOL/L (ref 3.5–5.1)
RBC # BLD: 3.75 M/UL (ref 4–5.2)
SODIUM BLD-SCNC: 134 MMOL/L (ref 136–145)
WBC # BLD: 13.2 K/UL (ref 4–11)

## 2021-12-19 PROCEDURE — 87086 URINE CULTURE/COLONY COUNT: CPT

## 2021-12-19 PROCEDURE — 83735 ASSAY OF MAGNESIUM: CPT

## 2021-12-19 PROCEDURE — 2580000003 HC RX 258: Performed by: INTERNAL MEDICINE

## 2021-12-19 PROCEDURE — 6370000000 HC RX 637 (ALT 250 FOR IP): Performed by: NURSE PRACTITIONER

## 2021-12-19 PROCEDURE — 85025 COMPLETE CBC W/AUTO DIFF WBC: CPT

## 2021-12-19 PROCEDURE — 6360000002 HC RX W HCPCS: Performed by: INTERNAL MEDICINE

## 2021-12-19 PROCEDURE — 6370000000 HC RX 637 (ALT 250 FOR IP): Performed by: INTERNAL MEDICINE

## 2021-12-19 PROCEDURE — 94761 N-INVAS EAR/PLS OXIMETRY MLT: CPT

## 2021-12-19 PROCEDURE — 36415 COLL VENOUS BLD VENIPUNCTURE: CPT

## 2021-12-19 PROCEDURE — 80048 BASIC METABOLIC PNL TOTAL CA: CPT

## 2021-12-19 PROCEDURE — 99238 HOSP IP/OBS DSCHRG MGMT 30/<: CPT | Performed by: INTERNAL MEDICINE

## 2021-12-19 PROCEDURE — 2700000000 HC OXYGEN THERAPY PER DAY

## 2021-12-19 RX ORDER — VITAMIN B COMPLEX
1000 TABLET ORAL 2 TIMES DAILY WITH MEALS
Status: DISCONTINUED | OUTPATIENT
Start: 2021-12-19 | End: 2021-12-19 | Stop reason: HOSPADM

## 2021-12-19 RX ORDER — ZOLPIDEM TARTRATE 5 MG/1
5 TABLET ORAL NIGHTLY PRN
Status: DISCONTINUED | OUTPATIENT
Start: 2021-12-19 | End: 2021-12-19 | Stop reason: HOSPADM

## 2021-12-19 RX ORDER — CEPHALEXIN 500 MG/1
500 CAPSULE ORAL 3 TIMES DAILY
Qty: 15 CAPSULE | Refills: 0 | Status: SHIPPED | OUTPATIENT
Start: 2021-12-19

## 2021-12-19 RX ORDER — CYCLOBENZAPRINE HCL 10 MG
5 TABLET ORAL 2 TIMES DAILY PRN
Status: DISCONTINUED | OUTPATIENT
Start: 2021-12-19 | End: 2021-12-19 | Stop reason: HOSPADM

## 2021-12-19 RX ADMIN — URSODIOL 300 MG: 300 CAPSULE ORAL at 08:18

## 2021-12-19 RX ADMIN — SERTRALINE HYDROCHLORIDE 50 MG: 50 TABLET ORAL at 08:18

## 2021-12-19 RX ADMIN — Medication 1000 UNITS: at 10:17

## 2021-12-19 RX ADMIN — LEVOTHYROXINE SODIUM 50 MCG: 25 TABLET ORAL at 05:09

## 2021-12-19 RX ADMIN — SODIUM CHLORIDE, PRESERVATIVE FREE 10 ML: 5 INJECTION INTRAVENOUS at 08:19

## 2021-12-19 RX ADMIN — CEFTRIAXONE SODIUM 1000 MG: 1 INJECTION, POWDER, FOR SOLUTION INTRAMUSCULAR; INTRAVENOUS at 10:37

## 2021-12-19 RX ADMIN — APIXABAN 10 MG: 5 TABLET, FILM COATED ORAL at 08:18

## 2021-12-19 NOTE — PLAN OF CARE
Problem: Falls - Risk of:  Goal: Will remain free from falls  Description: Will remain free from falls  12/19/2021 1111 by Bronson Chi RN  Outcome: Completed  12/19/2021 1111 by Bronson Chi RN  Outcome: Ongoing  12/19/2021 0817 by Bronson Chi RN  Outcome: Ongoing  Goal: Absence of physical injury  Description: Absence of physical injury  12/19/2021 1111 by Bronson Chi RN  Outcome: Completed  12/19/2021 1111 by Bronson Chi RN  Outcome: Ongoing  12/19/2021 0817 by Bronson Chi RN  Outcome: Ongoing     Problem: SAFETY  Goal: Free from accidental physical injury  12/19/2021 1111 by Bronson Chi RN  Outcome: Completed  12/19/2021 1111 by Bronson Chi RN  Outcome: Ongoing  12/19/2021 0817 by Bronson Chi RN  Outcome: Ongoing  Goal: Free from intentional harm  12/19/2021 1111 by Bronson Chi RN  Outcome: Completed  12/19/2021 1111 by Bronson Chi RN  Outcome: Ongoing  12/19/2021 0817 by Bronson Chi RN  Outcome: Ongoing     Problem: DAILY CARE  Goal: Daily care needs are met  12/19/2021 1111 by Bronson Chi RN  Outcome: Completed  12/19/2021 1111 by Bronson Chi RN  Outcome: Ongoing  12/19/2021 0817 by Bronson Chi RN  Outcome: Ongoing     Problem: PAIN  Goal: Patient's pain/discomfort is manageable  12/19/2021 1111 by Bronson Chi RN  Outcome: Completed  12/19/2021 1111 by Bronson Chi RN  Outcome: Ongoing  12/19/2021 0817 by Bronson Chi RN  Outcome: Ongoing     Problem: SKIN INTEGRITY  Goal: Skin integrity is maintained or improved  12/19/2021 1111 by Bronson Chi RN  Outcome: Completed  12/19/2021 1111 by Bronson Chi RN  Outcome: Ongoing  12/19/2021 0817 by Bronson Chi RN  Outcome: Ongoing     Problem: KNOWLEDGE DEFICIT  Goal: Patient/S.O. demonstrates understanding of disease process, treatment plan, medications, and discharge instructions.   12/19/2021 1111 by Bronson Chi RN  Outcome: Completed  12/19/2021 1111 by Alexus Nye RN  Outcome: Ongoing  12/19/2021 0817 by Alexus Nye RN  Outcome: Ongoing     Problem: Pain:  Goal: Pain level will decrease  Description: Pain level will decrease  12/19/2021 1111 by Alexus Nye RN  Outcome: Completed  12/19/2021 1111 by Alexus Nye RN  Outcome: Ongoing  12/19/2021 0817 by Alexus Nye RN  Outcome: Ongoing  Goal: Control of acute pain  Description: Control of acute pain  12/19/2021 1111 by Alexus Nye RN  Outcome: Completed  12/19/2021 1111 by Alexus Nye RN  Outcome: Ongoing  12/19/2021 0817 by Alexus Nye RN  Outcome: Ongoing  Goal: Control of chronic pain  Description: Control of chronic pain  12/19/2021 1111 by Alexus Nye RN  Outcome: Completed  12/19/2021 1111 by Alexus Nye RN  Outcome: Ongoing  12/19/2021 0817 by Alexus Nye RN  Outcome: Ongoing

## 2021-12-19 NOTE — PROGRESS NOTES
Pt resting in bed no s/s of distress. meds given per MAR, PRN tramadol and Zofran given at this time, pt denying any needs at this time.  Call light within reach, bed in lowest. Wilbur Dakins, RN

## 2021-12-19 NOTE — PLAN OF CARE
Problem: Falls - Risk of:  Goal: Will remain free from falls  Description: Will remain free from falls  Outcome: Ongoing  Goal: Absence of physical injury  Description: Absence of physical injury  Outcome: Ongoing     Problem: SAFETY  Goal: Free from accidental physical injury  Outcome: Ongoing  Goal: Free from intentional harm  Outcome: Ongoing     Problem: DAILY CARE  Goal: Daily care needs are met  Outcome: Ongoing     Problem: PAIN  Goal: Patient's pain/discomfort is manageable  Outcome: Ongoing     Problem: SKIN INTEGRITY  Goal: Skin integrity is maintained or improved  Outcome: Ongoing     Problem: KNOWLEDGE DEFICIT  Goal: Patient/S.O. demonstrates understanding of disease process, treatment plan, medications, and discharge instructions.   Outcome: Ongoing     Problem: DISCHARGE BARRIERS  Goal: Patient's continuum of care needs are met  Outcome: Ongoing     Problem: Pain:  Goal: Pain level will decrease  Description: Pain level will decrease  Outcome: Ongoing  Goal: Control of acute pain  Description: Control of acute pain  Outcome: Ongoing  Goal: Control of chronic pain  Description: Control of chronic pain  Outcome: Ongoing

## 2021-12-19 NOTE — FLOWSHEET NOTE
12/19/21 0800   Vital Signs   Temp 99.1 °F (37.3 °C)   Temp Source Oral   Pulse 74   Heart Rate Source Monitor   Resp 16   /73   Patient Position Semi fowlers   Level of Consciousness Alert (0)   MEWS Score 1   Oxygen Therapy   SpO2 95 %   O2 Device None (Room air)   Pt. Resting in bed. Call light in reach. Shift assessment completed see flow sheet. Denies any needs at this time. Will continue to monitor.

## 2021-12-19 NOTE — CARE COORDINATION
DISCHARGE ORDER  Date/Time 2021 11:03 AM  Completed by: Betsy Rhodes RN, Case Management    Patient Name: Aid Jerry      : 1940  Admitting Diagnosis: Nausea [R11.0]  Abdominal pain [R10.9]  Kidney infarction [facility]) [N28.0]  Abdominal pain, unspecified abdominal location [R10.9]      Admit order Date and Status:inpt  (verify MD's last order for status of admission)      Noted discharge order. If applicable PT/OT recommendation at Discharge: na  DME recommendation by PT/OT:na  Confirmed discharge plan  (pt)    Discharge Plan: pt IPTA and denies needs. Writer spoke with pharmacist from [facility] and she states pt does not have prescription drug coverage and first 30 day supply will be $646. Eliquis coupon provided to the pt for first 30 days. Pt aware of above cost and pt states she can afford this and she will have drug coverage starting in . Reviewed chart. Role of discharge planner explained and patient verbalized understanding. Discharge order is noted. Has Home O2 in place on admit:  No  Informed of need to bring portable home O2 tank on day of discharge for nursing to connect prior to leaving:   Not Indicated  Verbalized agreement/Understanding:   Not Indicated  Pt is being d/c'd to home today. Pt's O2 sats are 95% on RA. Discharge timeout done with TERRANCE Garsia. All discharge needs and concerns addressed.

## 2021-12-19 NOTE — PROGRESS NOTES
Patient educated on discharge instructions as well as new medications use, dosage, administration and possible side effects. Patient verified knowledge. IV removed without difficulty and dry dressing in place. Telemetry monitor removed and returned to [facility]. Pt left facility in stable condition to Home with all of their personal belongings.

## 2021-12-20 LAB — URINE CULTURE, ROUTINE: NORMAL

## 2021-12-22 LAB
BLOOD CULTURE, ROUTINE: NORMAL
CULTURE, BLOOD 2: NORMAL

## 2022-01-03 NOTE — DISCHARGE SUMMARY
Physician Discharge Summary     Patient ID:  Servando Sarmiento  [id number]  80 y.o.  1940    Admit date: 12/16/2021    Discharge date and time: 12/19/2021  1:20 PM     Admitting Physician: Lashanda Stewart MD     Discharge Physician: Lashanda Stewart MD    Admission Diagnoses: Nausea [R11.0]  Abdominal pain [R10.9]  Kidney infarction [facility]) [N28.0]  Abdominal pain, unspecified abdominal location [R10.9]    Discharge Diagnoses: Active Problems:    Abdominal pain    Renal infarct (HCC)    Hypoxia    PAF (paroxysmal atrial fibrillation) (HCC)    Leukocytosis    Primary hypertension  Resolved Problems:    * No resolved hospital problems. *      Admission Condition: fair    Discharged Condition: stable    Indication for Admission:   Per HPI  The patient is a 80 y.o. female with PMH of PAF, depression,  HTN, GERD, H/O tularemia 2003 with liver cirrhosis, who presents to [facility] with abdominal pain. History obtained from the patient and review of EMR. Pt stated her adbominal pain started yesterday at 11 am.  She stated at its worst it was a 9/10 and now a 5/10. She stated that she has some nausea associated with the pain, no vomiting. She did not take anything at home to help the pain. She stated that he medicine they are giving her now is helping the pain. Currently pain is a 5. She describes the pain as a constant dull pain that starts in mid abdomen and radiates to right back. She denies any diarrhea or constipation. Pt stated she has had generalized weakness ongoing for the last 6 months. She is raising 3 grandchildren at home. She denies any shortness of breath, chest pain, dizziness, fever, or chills. She stated that she has a hx of PAF s/p ablation and was on blood thinners until post ablation. She does have occasional palpitations.   She stated that she had a holter monitor months ago and did not show any PAF.       In ED CT of abdomen showed acute appearing infarct within the right kidney- involving the upper pole as well as the interpolar region. Per ER noted they consulted with vascular DrMyrna Landa and discussed case with him. He recommends anticoagulation with either Eliquis or Xarelto and follow- up with vascular surgery.         Hospital Course:      Acute Right Renal Infarct  - Patient presented with complaints of abdominal pain . CT abdomen showed renal infarct. Report has been addended to confirm right renal infarct, CT did not show any other acute intra-abdominal pathology . - patient started on anticoagulation with Eliquis. - vascular surgery was consulted in ED and recommended to start Eliquis or Xarelto and outpatient follow up with vascular surgery  - echo reviewed   - continue ELIQUIS. DC home . Follow up with Vascular surgery   Patient will need outpatient vascular surgery follow-up     Abdominal pain   -Secondary to renal infarct . - Patient has had a cholecystectomy. CT abdomen did not show any other acute abnormality. - pain improved now      Elevated procalcitonin with leukocytosis, fevers   -source of infection unclear, possible UTI  -  started empiric antibiotics with Rocephin  -  patient denies any dysuria,  denies any cough or sputum production no diarrhea. - Patient is doing better today . abdominal pain is improved. no nausea, tolerating an oral diet. - low-grade fevers at 99.1 °F. Cultures negative .   - White count is down to 13.2  DC on PO abx     Hypoxia  - noted while sleeping, placed on 2 liter n/c  - suspect related to IV Dilaudid  - change to PO pain mediation  - continue to monitor  - continuous pulse ox   sats stable today , on RA     PAF  - currenlty in NSR  - s/p ablation  - recent holter monitor in 1/21 showed no PAF     Hypomagnesemia   - replaced      HTN  - cont home medication       Hypothyroidism  - continue home dose of synthroid      Depression   - cont home medications     Medication Induced Cirrhosis  - on  urosodiol 250 mg BID, continue       Diet:           Echocardiogram   Summary   Poor acoustic windows. Left ventricular systolic function is normal with   ejection fraction estimated at 55%.   No regional wall motion abnormalities.   There is mild concentric left ventricular hypertrophy.   Grade I diastolic dysfunction with normal left ventricular filling pressure.   Systolic pulmonary artery pressure (SPAP) is normal estimated at 22 mmHg   (Right atrial pressure of 8 mmHg).   No obvious valvular endocarditis.   Consider DENNY if clinically endocarditis is suspected         Treatments: as above      Discharge Exam:    Objective:   /73   Pulse 74   Temp 99.1 °F (37.3 °C) (Oral)   Resp 16   Wt 196 lb 11.2 oz (89.2 kg)   SpO2 95%   BMI 31.75 kg/m²        Intake/Output Summary (Last 24 hours) at 12/19/2021 1045  Last data filed at 12/19/2021 0905      Gross per 24 hour   Intake 715 ml   Output 350 ml   Net 365 ml         Physical Exam:  Gen: No distress. Alert. Eyes: PERRL. No sclera icterus. No conjunctival injection. ENT: No discharge. Pharynx clear. Neck: No JVD. Trachea midline. Resp: No accessory muscle use. No crackles. No wheezes. No rhonchi. CV: Regular rate. Regular rhythm. No murmur.  No rub. No edema. GI: TTP right lowe/mid abdomen. Non-distended. No masses. No organomegaly. Normal bowel sounds. No hernia. Skin: Warm and dry. No nodule on exposed extremities. No rash on exposed extremities. M/S: No cyanosis. No joint deformity. No clubbing. Neuro: Awake. Grossly nonfocal    Psych: Oriented x 3.  No anxiety or agitation          Disposition: home    Patient Instructions:      Medication List      START taking these medications    apixaban 5 MG Tabs tablet  Commonly known as: Eliquis  2 tabs twice a day for 5 days ,   then 1 tab twice a day     cephALEXin 500 MG capsule  Commonly known as: KEFLEX  Take 1 capsule by mouth 3 times daily        CONTINUE taking these medications    cyclobenzaprine 10 MG tablet  Commonly known as: FLEXERIL     levothyroxine 50 MCG tablet  Commonly known as: SYNTHROID     meclizine 25 MG tablet  Commonly known as: ANTIVERT     sertraline 50 MG tablet  Commonly known as: ZOLOFT     EMILY 250 MG tablet  Generic drug: ursodiol     Vitamin D 1000 units Caps capsule  Commonly known as: CHOLECALCIFEROL     zolpidem 5 MG tablet  Commonly known as: AMBIEN        STOP taking these medications    dicyclomine 20 MG tablet  Commonly known as: Bentyl     ondansetron 4 MG disintegrating tablet  Commonly known as: ZOFRAN-ODT     sotalol 80 MG tablet  Commonly known as: BETAPACE           Where to Get Your Medications      You can get these medications from any pharmacy    Bring a paper prescription for each of these medications  · apixaban 5 MG Tabs tablet  · cephALEXin 500 MG capsule       Activity: activity as tolerated  Diet: regular diet  Wound Care: as directed    Follow-up with PCP in 1 week.     Signed:  Denise Bautista MD

## 2022-03-30 ENCOUNTER — HOSPITAL ENCOUNTER (EMERGENCY)
Age: 82
Discharge: HOME | End: 2022-03-30
Attending: EMERGENCY MEDICINE
Payer: MEDICARE

## 2022-03-30 VITALS
RESPIRATION RATE: 16 BRPM | TEMPERATURE: 99.7 F | SYSTOLIC BLOOD PRESSURE: 117 MMHG | DIASTOLIC BLOOD PRESSURE: 62 MMHG | WEIGHT: 194 LBS | HEIGHT: 66 IN | OXYGEN SATURATION: 95 % | HEART RATE: 69 BPM | BODY MASS INDEX: 31.18 KG/M2

## 2022-03-30 DIAGNOSIS — B00.89 HERPES ZOSTER DERMATITIS WITH OPHTHALMIC COMPLICATION: Primary | ICD-10-CM

## 2022-03-30 DIAGNOSIS — L30.3 HERPES ZOSTER DERMATITIS WITH OPHTHALMIC COMPLICATION: Primary | ICD-10-CM

## 2022-03-30 DIAGNOSIS — B02.30 HERPES ZOSTER DERMATITIS WITH OPHTHALMIC COMPLICATION: Primary | ICD-10-CM

## 2022-03-30 PROCEDURE — 6370000000 HC RX 637 (ALT 250 FOR IP): Performed by: EMERGENCY MEDICINE

## 2022-03-30 PROCEDURE — 99284 EMERGENCY DEPT VISIT MOD MDM: CPT

## 2022-03-30 RX ORDER — DOXYCYCLINE HYCLATE 100 MG
100 TABLET ORAL 2 TIMES DAILY
Qty: 14 TABLET | Refills: 0 | Status: SHIPPED | OUTPATIENT
Start: 2022-03-30 | End: 2022-04-06

## 2022-03-30 RX ORDER — DOXYCYCLINE HYCLATE 100 MG
100 TABLET ORAL ONCE
Status: DISCONTINUED | OUTPATIENT
Start: 2022-03-30 | End: 2022-03-31 | Stop reason: HOSPADM

## 2022-03-30 RX ORDER — ACETAMINOPHEN 325 MG/1
650 TABLET ORAL ONCE
Status: COMPLETED | OUTPATIENT
Start: 2022-03-30 | End: 2022-03-30

## 2022-03-30 RX ORDER — GABAPENTIN 100 MG/1
100 CAPSULE ORAL 3 TIMES DAILY PRN
Qty: 15 CAPSULE | Refills: 0 | Status: SHIPPED | OUTPATIENT
Start: 2022-03-30 | End: 2022-04-04

## 2022-03-30 RX ORDER — VALACYCLOVIR HYDROCHLORIDE 1 G/1
1000 TABLET, FILM COATED ORAL 3 TIMES DAILY
Qty: 21 TABLET | Refills: 0 | Status: SHIPPED | OUTPATIENT
Start: 2022-03-30 | End: 2022-04-06

## 2022-03-30 RX ORDER — ACYCLOVIR 800 MG/1
800 TABLET ORAL ONCE
Status: COMPLETED | OUTPATIENT
Start: 2022-03-30 | End: 2022-03-30

## 2022-03-30 RX ORDER — TETRACAINE HYDROCHLORIDE 5 MG/ML
1 SOLUTION OPHTHALMIC ONCE
Status: DISCONTINUED | OUTPATIENT
Start: 2022-03-30 | End: 2022-03-31 | Stop reason: HOSPADM

## 2022-03-30 RX ADMIN — ACETAMINOPHEN 650 MG: 325 TABLET ORAL at 22:41

## 2022-03-30 RX ADMIN — ACYCLOVIR 800 MG: 800 TABLET ORAL at 22:42

## 2022-03-30 ASSESSMENT — VISUAL ACUITY
OS: 20/40
OU: 20/30
OD: 20/40

## 2022-03-30 ASSESSMENT — PAIN DESCRIPTION - FREQUENCY: FREQUENCY: CONTINUOUS

## 2022-03-30 ASSESSMENT — PAIN SCALES - GENERAL: PAINLEVEL_OUTOF10: 5

## 2022-03-31 NOTE — ED TRIAGE NOTES
patient had pain x 2 days and burning to right side of face, forehead and head. Yesterday the rash began and now has rash to same area.  Focused around right eye and forehead, but has some under right hairline

## 2022-03-31 NOTE — ED PROVIDER NOTES
[address] ED  EMERGENCY DEPARTMENT ENCOUNTER        Pt Name: Gustavo Champion  MRN: [id number]  [place] 1940  Date of evaluation: 3/30/2022  Provider: Javad Miller MD  PCP: Jhonny Schwab, MD Gussie Bucker       Chief Complaint   Patient presents with    Rash       HISTORY Merle   (Location/Symptom, Timing/Onset, Context/Setting, Quality, Duration, Modifying Factors,Severity)  Note limiting factors. Gustavo Champion is a 80 y.o. female presenting today due to concern for developing a rash to the right side of her forehead and cheek since yesterday but having some discomfort to the right side of her face for the last 3 days. She does report that her right eye also has slight discomfort and feels like there is some pressure behind it. She denies any falls or trauma. She denies any true fever although did have a slightly elevated temperature in the ED. She denies any chest pain or shortness of breath. She denies any ear pain or sore throat. She denies any history of shingles. She does wear glasses. Due to concern with the rash being shingles, she came to the ED for further evaluation. She denies being around anybody immunocompromised or any small children. REVIEW OF SYSTEMS    (2-9 systems for level 4, 10 or more for level 5)     Review of Systems   Constitutional: Negative for chills, diaphoresis, fatigue and fever. HENT: Negative for congestion, ear discharge, ear pain, sinus pressure and sore throat. Eyes: Positive for pain (mild to right eye) and visual disturbance (slightly to right eye). Negative for photophobia, discharge, redness and itching. Respiratory: Negative for shortness of breath. Cardiovascular: Negative for chest pain. Gastrointestinal: Negative for abdominal pain, nausea and vomiting. Skin: Positive for rash (right face involving forehead and cheek only). Negative for wound.    Allergic/Immunologic: Negative for immunocompromised state (denies). Neurological: Negative for weakness, numbness and headaches (other than involving her face). Psychiatric/Behavioral: The patient is not nervous/anxious. Positives and Pertinent negatives as per HPI. PASTMEDICAL HISTORY     Past Medical History:   Diagnosis Date    Atrial fibrillation ([address]     Depression          SURGICAL HISTORY       Past Surgical History:   Procedure Laterality Date    ABLATION OF DYSRHYTHMIC FOCUS      CHOLECYSTECTOMY      COLONOSCOPY N/A 3/26/2021    COLONOSCOPY POLYPECTOMY ABLATION performed by Maximino Lemus MD at [facility]      right knee    UPPER GASTROINTESTINAL ENDOSCOPY N/A 3/26/2021    EGD BIOPSY performed by Maximino Lemus MD at [address] [facility]       Discharge Medication List as of 3/30/2022 11:11 PM      CONTINUE these medications which have NOT CHANGED    Details   apixaban (ELIQUIS) 5 MG TABS tablet 2 tabs twice a day for 5 days ,   then 1 tab twice a day, Disp-70 tablet, R-1Print      cephALEXin (KEFLEX) 500 MG capsule Take 1 capsule by mouth 3 times daily, Disp-15 capsule, R-0Print      cyclobenzaprine (FLEXERIL) 10 MG tablet TAKE 1 TABLET BY MOUTH EVERY NIGHT AS NEEDEDHistorical Med      levothyroxine (SYNTHROID) 50 MCG tablet Take 50 mcg by mouth dailyHistorical Med      meclizine (ANTIVERT) 25 MG tablet TAKE 1 TABLET BY MOUTH THREE TIMES DAILY AS NEEDEDHistorical Med      sertraline (ZOLOFT) 50 MG tablet Take 50 mg by mouth dailyHistorical Med      ursodiol (EMILY) 250 MG tablet Take by mouthHistorical Med      zolpidem (AMBIEN) 5 MG tablet Take 5 mg by mouth nightly as needed for Sleep. Historical Med      Vitamin D (CHOLECALCIFEROL) 1000 UNITS CAPS capsule Take 1,000 Units by mouth 2 times daily (with meals).              ALLERGIES     Quinidex [quinidine]    FAMILY HISTORY       Family History   Problem Relation Age of Onset    No Known Problems Mother  No Known Problems Father     Cancer Brother           SOCIAL HISTORY       Social History     Socioeconomic History    Marital status:      Spouse name: None    Number of children: None    Years of education: None    Highest education level: None   Occupational History    None   Tobacco Use    Smoking status: Never Smoker    Smokeless tobacco: Never Used   Vaping Use    Vaping Use: Never used   Substance and Sexual Activity    Alcohol use: Never    Drug use: Never    Sexual activity: Not Currently     Partners: Male   Other Topics Concern    None   Social History Narrative    None     Social Determinants of Health     Financial Resource Strain:     Difficulty of Paying Living Expenses: Not on file   Food Insecurity:     Worried About Running Out of Food in the Last Year: Not on file    Jaqueline of Food in the Last Year: Not on file   Transportation Needs:     Lack of Transportation (Medical): Not on file    Lack of Transportation (Non-Medical):  Not on file   Physical Activity:     Days of Exercise per Week: Not on file    Minutes of Exercise per Session: Not on file   Stress:     Feeling of Stress : Not on file   Social Connections:     Frequency of Communication with Friends and Family: Not on file    Frequency of Social Gatherings with Friends and Family: Not on file    Attends Alevism Services: Not on file    Active Member of [address] [place] [address] [place] or Organizations: Not on file    Attends Club or Organization Meetings: Not on file    Marital Status: Not on file   Intimate Partner Violence:     Fear of Current or Ex-Partner: Not on file    Emotionally Abused: Not on file    Physically Abused: Not on file    Sexually Abused: Not on file   Housing Stability:     Unable to Pay for Housing in the Last Year: Not on file    Number of [place] in the Last Year: Not on file    Unstable Housing in the Last Year: Not on file       SCREENINGS    [place] Coma Scale  Eye Opening: Spontaneous  Best Verbal Response: Oriented  Best Motor Response: Obeys commands  [place] Coma Scale Score: 15           PHYSICAL EXAM    (up to 7 for level 4, 8 or more for level 5)     ED Triage Vitals [03/30/22 2146]   BP Temp Temp Source Pulse Resp SpO2 Height Weight   132/79 99.7 °F (37.6 °C) Oral 73 18 99 % 5' 6\" (1.676 m) 194 lb (88 kg)       Physical Exam  Vitals and nursing note reviewed. Constitutional:       General: She is not in acute distress. Appearance: Normal appearance. She is obese. She is not ill-appearing, toxic-appearing or diaphoretic. HENT:      Head: Normocephalic and atraumatic. Right periorbital erythema present. No raccoon eyes, Penn's sign, abrasion, contusion, masses, left periorbital erythema or laceration. Hair is normal.      Jaw: There is normal jaw occlusion. No trismus, tenderness, swelling or pain on movement. Right Ear: Tympanic membrane, ear canal and external ear normal. No drainage, swelling or tenderness. No middle ear effusion. There is no impacted cerumen. No mastoid tenderness. No hemotympanum. Tympanic membrane is not injected. Left Ear: Tympanic membrane, ear canal and external ear normal. No drainage, swelling or tenderness. No middle ear effusion. There is no impacted cerumen. No mastoid tenderness. No hemotympanum. Tympanic membrane is not injected. Ears:        Nose: No nasal tenderness, mucosal edema, congestion or rhinorrhea. Right Sinus: Frontal sinus tenderness (minimal noted to rash only) present. No maxillary sinus tenderness. Left Sinus: No maxillary sinus tenderness or frontal sinus tenderness. Mouth/Throat:      Lips: Pink. No lesions. Mouth: Mucous membranes are moist. No lacerations, oral lesions or angioedema. Tongue: No lesions. Tongue does not deviate from midline. Palate: No mass and lesions. Pharynx: Oropharynx is clear. No oropharyngeal exudate or posterior oropharyngeal erythema.    Eyes: General: Lids are normal. No visual field deficit or scleral icterus. Right eye: No foreign body or discharge. Left eye: No foreign body or discharge. Extraocular Movements: Extraocular movements intact. Right eye: Normal extraocular motion and no nystagmus. Left eye: Normal extraocular motion and no nystagmus. Conjunctiva/sclera:      Right eye: Right conjunctiva is not injected. No chemosis, exudate or hemorrhage. Left eye: No chemosis, exudate or hemorrhage. Pupils: Pupils are equal, round, and reactive to light. Right eye: Pupil is round, reactive and not sluggish. Fluorescein uptake present. No corneal abrasion. Bridget exam negative. Left eye: Pupil is round, reactive and not sluggish. No corneal abrasion or fluorescein uptake. Bridget exam negative. Slit lamp exam:     Right eye: No corneal ulcer, foreign body, hyphema, hypopyon or photophobia. Left eye: No corneal ulcer, foreign body, hyphema, hypopyon or photophobia. Visual Fields: Right eye visual fields normal and left eye visual fields normal.        Comments: OD = 20/40  OS = 20/40  OU = 20/30  With glasses    Cardiovascular:      Rate and Rhythm: Normal rate and regular rhythm. Pulses: Normal pulses. Pulmonary:      Effort: Pulmonary effort is normal. No respiratory distress. Breath sounds: Normal breath sounds. No stridor. Abdominal:      General: Abdomen is flat. There is no distension. Tenderness: There is no abdominal tenderness. There is no guarding or rebound. Musculoskeletal:         General: No deformity. Cervical back: Full passive range of motion without pain, normal range of motion and neck supple. No edema, erythema, signs of trauma, rigidity, torticollis, tenderness or crepitus. No pain with movement, spinous process tenderness or muscular tenderness. Normal range of motion. Skin:     General: Skin is warm and dry.       Coloration: Skin is not ashen, cyanotic, jaundiced or pale. Findings: Erythema and rash (noted to V1 distribution to right side of face only, does not cross midline ) present. No abrasion, abscess, bruising, ecchymosis, signs of injury, laceration or petechiae. Rash is vesicular. Rash is not crusting, macular or papular. Neurological:      General: No focal deficit present. Mental Status: She is alert and oriented to person, place, and time. Mental status is at baseline. GCS: GCS eye subscore is 4. GCS verbal subscore is 5. GCS motor subscore is 6. Cranial Nerves: Cranial nerves are intact. No cranial nerve deficit, dysarthria or facial asymmetry. Sensory: Sensation is intact. No sensory deficit. Motor: Motor function is intact. No weakness, atrophy or seizure activity. Gait: Gait is intact. Gait normal.   Psychiatric:         Mood and Affect: Mood normal.         Behavior: Behavior normal.             DIAGNOSTIC RESULTS   :    Labs Reviewed - No data to display    All other labs were within normal range or not returned asof this dictation. EKG: All EKG's are interpreted by the Emergency Department Physician who either signs or Co-signs this chart in the absence of a cardiologist.        RADIOLOGY:   Non-plain film images such as CT, Ultrasound and MRI are read by the radiologist. Emaline Mansi images are visualized and preliminarily interpreted by the  ED Provider with the belowfindings:        Interpretation per the Radiologist below, if available at the time of this note:    No orders to display         PROCEDURES   Unless otherwise noted below, none     Procedures    CRITICAL CARE TIME   N/A    CONSULTS: Spoke with Dr. Dalila Cuevas at [phone] and he stated at this point oral antivirals is all that is warranted but they do want to see her in the office tomorrow and at that point we will determine if she needs any steroids or additional therapies due to concern for herpes zoster ophthalmicus.     IP CONSULT TO OPHTHALMOLOGY    EMERGENCY DEPARTMENT COURSE and DIFFERENTIAL DIAGNOSIS/MDM:   Vitals:    Vitals:    03/30/22 2146 03/30/22 2315   BP: 132/79 117/62   Pulse: 73 69   Resp: 18 16   Temp: 99.7 °F (37.6 °C)    TempSrc: Oral    SpO2: 99% 95%   Weight: 194 lb (88 kg)    Height: 5' 6\" (1.676 m)        Patient was given the following medications:  Medications   acyclovir (ZOVIRAX) tablet 800 mg (800 mg Oral Given 3/30/22 2242)   acetaminophen (TYLENOL) tablet 650 mg (650 mg Oral Given 3/30/22 2241)     Patient was evaluated due to concern for right-sided facial discomfort and concerned that she could have shingles. Rash was consistent with shingles and therefore she was started on acyclovir in the ED and prescribed Valtrex. Symptoms started within 72 hours involving the rash and therefore she is a good candidate for Valtrex but also with concern for some eye involvement, this is another reason to take oral antivirals. I did speak to ophthalmology and no recommendation of steroids or other treatment at this point. Visual acuities were overall reassuring with glasses. She knows to follow-up urgently with ophthalmology tomorrow but if she develops any worsening symptoms prior to that involving severe headache, redness spreading, worsening vision, then return immediately to the ED for further assessment. She was well-appearing and in no acute distress at time of discharge and felt comfortable with this plan. She also had some overlying erythema as well and with her having a borderline elevated temperature, I do wonder if she does have possibility of an early secondary infection related to cellulitis and therefore I did start her on doxycycline as well just to be safe. She was told to try gabapentin for burning discomfort but otherwise Tylenol or Motrin for pain.     At this time, I have very low suspicion for brain abscess, cavernous sinus thrombosis, scleritis, erysipelas, meningitis, amongst other serious pathology. The patient tolerated their visit well. The patient and / or the family were informed of the results of any tests, a time was given to answer questions. FINAL IMPRESSION      1. Herpes zoster dermatitis with ophthalmic complication          DISPOSITION/PLAN   DISPOSITION Decision To Discharge 03/30/2022 11:04:26 PM      PATIENT REFERRED TO:  [facility]  [address] [facility]  Go to   If symptoms worsen    MD [address] Doug Bateliers  [phone]    In 3 days  For wound re-check    [place] [id number]  [phone]    Schedule an appointment as soon as possible for a visit in 1 day        DISCHARGEMEDICATIONS:  Discharge Medication List as of 3/30/2022 11:11 PM      START taking these medications    Details   valACYclovir (VALTREX) 1 g tablet Take 1 tablet by mouth 3 times daily for 7 days, Disp-21 tablet, R-0Print      doxycycline hyclate (VIBRA-TABS) 100 MG tablet Take 1 tablet by mouth 2 times daily for 7 days, Disp-14 tablet, R-0Print      gabapentin (NEURONTIN) 100 MG capsule Take 1 capsule by mouth 3 times daily as needed (burning pain to face) for up to 5 days. , Disp-15 capsule, R-0Print             DISCONTINUED MEDICATIONS:  Discharge Medication List as of 3/30/2022 11:11 PM                 (Please note that portions of this note were completed with a voicerecognition program.  Efforts were made to edit the dictations but occasionally words are mis-transcribed.)    Brook Bowman MD (electronically signed)            Brook Bowman MD  04/01/22 Kali Broderick MD  04/01/22 [id number]

## 2022-04-01 ASSESSMENT — ENCOUNTER SYMPTOMS
EYE DISCHARGE: 0
EYE ITCHING: 0
SINUS PRESSURE: 0
SHORTNESS OF BREATH: 0
EYE REDNESS: 0
ABDOMINAL PAIN: 0
PHOTOPHOBIA: 0
SORE THROAT: 0
EYE PAIN: 1
NAUSEA: 0
VOMITING: 0

## 2024-09-05 ENCOUNTER — HOSPITAL ENCOUNTER (INPATIENT)
Age: 84
LOS: 2 days | Discharge: HOME | DRG: 149 | End: 2024-09-07
Attending: EMERGENCY MEDICINE | Admitting: INTERNAL MEDICINE
Payer: MEDICARE

## 2024-09-05 ENCOUNTER — APPOINTMENT (OUTPATIENT)
Dept: GENERAL RADIOLOGY | Age: 84
DRG: 149 | End: 2024-09-05
Payer: MEDICARE

## 2024-09-05 ENCOUNTER — APPOINTMENT (OUTPATIENT)
Dept: CT IMAGING | Age: 84
DRG: 149 | End: 2024-09-05
Payer: MEDICARE

## 2024-09-05 DIAGNOSIS — E87.6 HYPOKALEMIA: ICD-10-CM

## 2024-09-05 DIAGNOSIS — I10 HYPERTENSION, UNSPECIFIED TYPE: ICD-10-CM

## 2024-09-05 DIAGNOSIS — H81.12 BENIGN PAROXYSMAL POSITIONAL VERTIGO OF LEFT EAR: ICD-10-CM

## 2024-09-05 DIAGNOSIS — I1A.0 RESISTANT HYPERTENSION: ICD-10-CM

## 2024-09-05 DIAGNOSIS — I63.9 CEREBROVASCULAR ACCIDENT (CVA), UNSPECIFIED MECHANISM (HCC): Primary | ICD-10-CM

## 2024-09-05 DIAGNOSIS — E83.42 HYPOMAGNESEMIA: ICD-10-CM

## 2024-09-05 DIAGNOSIS — R42 DIZZINESS: ICD-10-CM

## 2024-09-05 PROBLEM — I16.0 HYPERTENSIVE URGENCY: Status: ACTIVE | Noted: 2024-09-05

## 2024-09-05 PROBLEM — E03.9 HYPOTHYROIDISM: Status: ACTIVE | Noted: 2024-09-05

## 2024-09-05 PROBLEM — K21.9 GERD (GASTROESOPHAGEAL REFLUX DISEASE): Status: ACTIVE | Noted: 2024-09-05

## 2024-09-05 LAB
ANION GAP SERPL CALCULATED.3IONS-SCNC: 12 MMOL/L (ref 3–16)
ANION GAP SERPL CALCULATED.3IONS-SCNC: 13 MMOL/L (ref 3–16)
BASOPHILS # BLD: 0 K/UL (ref 0–0.2)
BASOPHILS NFR BLD: 0.6 %
BUN SERPL-MCNC: 11 MG/DL (ref 7–20)
BUN SERPL-MCNC: 13 MG/DL (ref 7–20)
CALCIUM SERPL-MCNC: 9.1 MG/DL (ref 8.3–10.6)
CALCIUM SERPL-MCNC: 9.3 MG/DL (ref 8.3–10.6)
CHLORIDE SERPL-SCNC: 103 MMOL/L (ref 99–110)
CHLORIDE SERPL-SCNC: 104 MMOL/L (ref 99–110)
CO2 SERPL-SCNC: 22 MMOL/L (ref 21–32)
CO2 SERPL-SCNC: 24 MMOL/L (ref 21–32)
CREAT SERPL-MCNC: 0.8 MG/DL (ref 0.6–1.2)
CREAT SERPL-MCNC: 0.9 MG/DL (ref 0.6–1.2)
DEPRECATED RDW RBC AUTO: 14.2 % (ref 12.4–15.4)
EKG ATRIAL RATE: 65 BPM
EKG DIAGNOSIS: NORMAL
EKG P AXIS: 73 DEGREES
EKG P-R INTERVAL: 174 MS
EKG Q-T INTERVAL: 422 MS
EKG QRS DURATION: 82 MS
EKG QTC CALCULATION (BAZETT): 438 MS
EKG R AXIS: -12 DEGREES
EKG T AXIS: 23 DEGREES
EKG VENTRICULAR RATE: 65 BPM
EOSINOPHIL # BLD: 0.1 K/UL (ref 0–0.6)
EOSINOPHIL NFR BLD: 1.9 %
GFR SERPLBLD CREATININE-BSD FMLA CKD-EPI: 64 ML/MIN/{1.73_M2}
GFR SERPLBLD CREATININE-BSD FMLA CKD-EPI: 74 ML/MIN/{1.73_M2}
GLUCOSE BLD-MCNC: 143 MG/DL (ref 70–99)
GLUCOSE SERPL-MCNC: 126 MG/DL (ref 70–99)
GLUCOSE SERPL-MCNC: 139 MG/DL (ref 70–99)
HCT VFR BLD AUTO: 39 % (ref 36–48)
HGB BLD-MCNC: 13.5 G/DL (ref 12–16)
INR PPP: 1.04 (ref 0.85–1.15)
LYMPHOCYTES # BLD: 2.1 K/UL (ref 1–5.1)
LYMPHOCYTES NFR BLD: 29.6 %
MAGNESIUM SERPL-MCNC: 0.8 MG/DL (ref 1.8–2.4)
MAGNESIUM SERPL-MCNC: 1 MG/DL (ref 1.8–2.4)
MAGNESIUM SERPL-MCNC: 2.9 MG/DL (ref 1.8–2.4)
MCH RBC QN AUTO: 29.8 PG (ref 26–34)
MCHC RBC AUTO-ENTMCNC: 34.7 G/DL (ref 31–36)
MCV RBC AUTO: 86 FL (ref 80–100)
MONOCYTES # BLD: 0.6 K/UL (ref 0–1.3)
MONOCYTES NFR BLD: 9 %
NEUTROPHILS # BLD: 4.2 K/UL (ref 1.7–7.7)
NEUTROPHILS NFR BLD: 58.9 %
PERFORMED ON: ABNORMAL
PLATELET # BLD AUTO: 171 K/UL (ref 135–450)
PMV BLD AUTO: 7.2 FL (ref 5–10.5)
POTASSIUM SERPL-SCNC: 2.9 MMOL/L (ref 3.5–5.1)
POTASSIUM SERPL-SCNC: 3 MMOL/L (ref 3.5–5.1)
POTASSIUM SERPL-SCNC: <1.5 MMOL/L (ref 3.5–5.1)
PROTHROMBIN TIME: 13.8 SEC (ref 11.9–14.9)
RBC # BLD AUTO: 4.54 M/UL (ref 4–5.2)
SODIUM SERPL-SCNC: 138 MMOL/L (ref 136–145)
SODIUM SERPL-SCNC: 140 MMOL/L (ref 136–145)
TROPONIN, HIGH SENSITIVITY: 12 NG/L (ref 0–14)
TROPONIN, HIGH SENSITIVITY: <6 NG/L (ref 0–14)
WBC # BLD AUTO: 7.1 K/UL (ref 4–11)

## 2024-09-05 PROCEDURE — 84132 ASSAY OF SERUM POTASSIUM: CPT

## 2024-09-05 PROCEDURE — 97530 THERAPEUTIC ACTIVITIES: CPT

## 2024-09-05 PROCEDURE — 97161 PT EVAL LOW COMPLEX 20 MIN: CPT

## 2024-09-05 PROCEDURE — 2580000003 HC RX 258

## 2024-09-05 PROCEDURE — 96366 THER/PROPH/DIAG IV INF ADDON: CPT

## 2024-09-05 PROCEDURE — 6360000004 HC RX CONTRAST MEDICATION: Performed by: EMERGENCY MEDICINE

## 2024-09-05 PROCEDURE — 96375 TX/PRO/DX INJ NEW DRUG ADDON: CPT

## 2024-09-05 PROCEDURE — 96376 TX/PRO/DX INJ SAME DRUG ADON: CPT

## 2024-09-05 PROCEDURE — 6370000000 HC RX 637 (ALT 250 FOR IP): Performed by: EMERGENCY MEDICINE

## 2024-09-05 PROCEDURE — 85610 PROTHROMBIN TIME: CPT

## 2024-09-05 PROCEDURE — 70496 CT ANGIOGRAPHY HEAD: CPT

## 2024-09-05 PROCEDURE — 80048 BASIC METABOLIC PNL TOTAL CA: CPT

## 2024-09-05 PROCEDURE — 83735 ASSAY OF MAGNESIUM: CPT

## 2024-09-05 PROCEDURE — 84484 ASSAY OF TROPONIN QUANT: CPT

## 2024-09-05 PROCEDURE — 2060000000 HC ICU INTERMEDIATE R&B

## 2024-09-05 PROCEDURE — 6360000002 HC RX W HCPCS: Performed by: EMERGENCY MEDICINE

## 2024-09-05 PROCEDURE — 93010 ELECTROCARDIOGRAM REPORT: CPT | Performed by: INTERNAL MEDICINE

## 2024-09-05 PROCEDURE — 6370000000 HC RX 637 (ALT 250 FOR IP)

## 2024-09-05 PROCEDURE — 70450 CT HEAD/BRAIN W/O DYE: CPT

## 2024-09-05 PROCEDURE — 85025 COMPLETE CBC W/AUTO DIFF WBC: CPT

## 2024-09-05 PROCEDURE — 97535 SELF CARE MNGMENT TRAINING: CPT

## 2024-09-05 PROCEDURE — 96374 THER/PROPH/DIAG INJ IV PUSH: CPT

## 2024-09-05 PROCEDURE — 99285 EMERGENCY DEPT VISIT HI MDM: CPT

## 2024-09-05 PROCEDURE — 97166 OT EVAL MOD COMPLEX 45 MIN: CPT

## 2024-09-05 PROCEDURE — 96365 THER/PROPH/DIAG IV INF INIT: CPT

## 2024-09-05 PROCEDURE — 6360000002 HC RX W HCPCS

## 2024-09-05 PROCEDURE — G0378 HOSPITAL OBSERVATION PER HR: HCPCS

## 2024-09-05 PROCEDURE — 6360000002 HC RX W HCPCS: Performed by: INTERNAL MEDICINE

## 2024-09-05 PROCEDURE — 96372 THER/PROPH/DIAG INJ SC/IM: CPT

## 2024-09-05 PROCEDURE — 93005 ELECTROCARDIOGRAM TRACING: CPT | Performed by: EMERGENCY MEDICINE

## 2024-09-05 PROCEDURE — 36415 COLL VENOUS BLD VENIPUNCTURE: CPT

## 2024-09-05 PROCEDURE — 71046 X-RAY EXAM CHEST 2 VIEWS: CPT

## 2024-09-05 PROCEDURE — 99223 1ST HOSP IP/OBS HIGH 75: CPT

## 2024-09-05 RX ORDER — SODIUM CHLORIDE 0.9 % (FLUSH) 0.9 %
5-40 SYRINGE (ML) INJECTION EVERY 12 HOURS SCHEDULED
Status: DISCONTINUED | OUTPATIENT
Start: 2024-09-05 | End: 2024-09-07 | Stop reason: HOSPADM

## 2024-09-05 RX ORDER — SOTALOL HYDROCHLORIDE 80 MG/1
80 TABLET ORAL 2 TIMES DAILY
Status: DISCONTINUED | OUTPATIENT
Start: 2024-09-05 | End: 2024-09-07 | Stop reason: HOSPADM

## 2024-09-05 RX ORDER — ONDANSETRON 2 MG/ML
4 INJECTION INTRAMUSCULAR; INTRAVENOUS ONCE
Status: COMPLETED | OUTPATIENT
Start: 2024-09-05 | End: 2024-09-05

## 2024-09-05 RX ORDER — IOPAMIDOL 755 MG/ML
75 INJECTION, SOLUTION INTRAVASCULAR
Status: COMPLETED | OUTPATIENT
Start: 2024-09-05 | End: 2024-09-05

## 2024-09-05 RX ORDER — SODIUM CHLORIDE 0.9 % (FLUSH) 0.9 %
5-40 SYRINGE (ML) INJECTION PRN
Status: DISCONTINUED | OUTPATIENT
Start: 2024-09-05 | End: 2024-09-07 | Stop reason: HOSPADM

## 2024-09-05 RX ORDER — POLYETHYLENE GLYCOL 3350 17 G/17G
17 POWDER, FOR SOLUTION ORAL DAILY PRN
Status: DISCONTINUED | OUTPATIENT
Start: 2024-09-05 | End: 2024-09-07 | Stop reason: HOSPADM

## 2024-09-05 RX ORDER — LEVOTHYROXINE SODIUM 50 UG/1
50 TABLET ORAL DAILY
Status: DISCONTINUED | OUTPATIENT
Start: 2024-09-06 | End: 2024-09-07 | Stop reason: HOSPADM

## 2024-09-05 RX ORDER — PANTOPRAZOLE SODIUM 40 MG/1
40 TABLET, DELAYED RELEASE ORAL
Status: DISCONTINUED | OUTPATIENT
Start: 2024-09-06 | End: 2024-09-07 | Stop reason: HOSPADM

## 2024-09-05 RX ORDER — PROMETHAZINE HYDROCHLORIDE 25 MG/ML
6.25 INJECTION, SOLUTION INTRAMUSCULAR; INTRAVENOUS EVERY 6 HOURS PRN
Status: DISCONTINUED | OUTPATIENT
Start: 2024-09-05 | End: 2024-09-07 | Stop reason: HOSPADM

## 2024-09-05 RX ORDER — ONDANSETRON 4 MG/1
4 TABLET, ORALLY DISINTEGRATING ORAL EVERY 8 HOURS PRN
Status: DISCONTINUED | OUTPATIENT
Start: 2024-09-05 | End: 2024-09-05

## 2024-09-05 RX ORDER — MAGNESIUM SULFATE IN WATER 40 MG/ML
2000 INJECTION, SOLUTION INTRAVENOUS PRN
Status: DISCONTINUED | OUTPATIENT
Start: 2024-09-05 | End: 2024-09-07 | Stop reason: HOSPADM

## 2024-09-05 RX ORDER — POTASSIUM CHLORIDE 1500 MG/1
TABLET, EXTENDED RELEASE ORAL
Status: DISPENSED
Start: 2024-09-05 | End: 2024-09-05

## 2024-09-05 RX ORDER — SOTALOL HYDROCHLORIDE 80 MG/1
80 TABLET ORAL 2 TIMES DAILY
COMMUNITY

## 2024-09-05 RX ORDER — MAGNESIUM SULFATE IN WATER 40 MG/ML
4000 INJECTION, SOLUTION INTRAVENOUS ONCE
Status: COMPLETED | OUTPATIENT
Start: 2024-09-05 | End: 2024-09-05

## 2024-09-05 RX ORDER — ONDANSETRON 2 MG/ML
4 INJECTION INTRAMUSCULAR; INTRAVENOUS EVERY 6 HOURS PRN
Status: DISCONTINUED | OUTPATIENT
Start: 2024-09-05 | End: 2024-09-05

## 2024-09-05 RX ORDER — POTASSIUM CHLORIDE 1500 MG/1
40 TABLET, EXTENDED RELEASE ORAL PRN
Status: DISCONTINUED | OUTPATIENT
Start: 2024-09-05 | End: 2024-09-07 | Stop reason: HOSPADM

## 2024-09-05 RX ORDER — ENOXAPARIN SODIUM 100 MG/ML
40 INJECTION SUBCUTANEOUS DAILY
Status: DISCONTINUED | OUTPATIENT
Start: 2024-09-05 | End: 2024-09-05

## 2024-09-05 RX ORDER — KETOROLAC TROMETHAMINE 15 MG/ML
15 INJECTION, SOLUTION INTRAMUSCULAR; INTRAVENOUS ONCE
Status: COMPLETED | OUTPATIENT
Start: 2024-09-05 | End: 2024-09-05

## 2024-09-05 RX ORDER — HYDRALAZINE HYDROCHLORIDE 20 MG/ML
10 INJECTION INTRAMUSCULAR; INTRAVENOUS ONCE
Status: COMPLETED | OUTPATIENT
Start: 2024-09-05 | End: 2024-09-05

## 2024-09-05 RX ORDER — LABETALOL HYDROCHLORIDE 5 MG/ML
10 INJECTION, SOLUTION INTRAVENOUS EVERY 6 HOURS PRN
Status: DISCONTINUED | OUTPATIENT
Start: 2024-09-05 | End: 2024-09-07 | Stop reason: HOSPADM

## 2024-09-05 RX ORDER — ASPIRIN 81 MG/1
81 TABLET, CHEWABLE ORAL DAILY
Status: DISCONTINUED | OUTPATIENT
Start: 2024-09-05 | End: 2024-09-07 | Stop reason: HOSPADM

## 2024-09-05 RX ORDER — POTASSIUM CHLORIDE 7.45 MG/ML
10 INJECTION INTRAVENOUS PRN
Status: DISCONTINUED | OUTPATIENT
Start: 2024-09-05 | End: 2024-09-07 | Stop reason: HOSPADM

## 2024-09-05 RX ORDER — SODIUM CHLORIDE 9 MG/ML
INJECTION, SOLUTION INTRAVENOUS PRN
Status: DISCONTINUED | OUTPATIENT
Start: 2024-09-05 | End: 2024-09-07 | Stop reason: HOSPADM

## 2024-09-05 RX ORDER — URSODIOL 250 MG/1
250 TABLET, FILM COATED ORAL 3 TIMES DAILY
Status: DISCONTINUED | OUTPATIENT
Start: 2024-09-05 | End: 2024-09-06 | Stop reason: SDUPTHER

## 2024-09-05 RX ORDER — POTASSIUM CHLORIDE 1500 MG/1
40 TABLET, EXTENDED RELEASE ORAL ONCE
Status: COMPLETED | OUTPATIENT
Start: 2024-09-05 | End: 2024-09-05

## 2024-09-05 RX ORDER — ATORVASTATIN CALCIUM 40 MG/1
80 TABLET, FILM COATED ORAL NIGHTLY
Status: DISCONTINUED | OUTPATIENT
Start: 2024-09-05 | End: 2024-09-07 | Stop reason: HOSPADM

## 2024-09-05 RX ORDER — ASPIRIN 300 MG/1
300 SUPPOSITORY RECTAL DAILY
Status: DISCONTINUED | OUTPATIENT
Start: 2024-09-05 | End: 2024-09-07 | Stop reason: HOSPADM

## 2024-09-05 RX ADMIN — LABETALOL HYDROCHLORIDE 10 MG: 5 INJECTION, SOLUTION INTRAVENOUS at 21:14

## 2024-09-05 RX ADMIN — POTASSIUM CHLORIDE 10 MEQ: 7.46 INJECTION, SOLUTION INTRAVENOUS at 22:43

## 2024-09-05 RX ADMIN — POTASSIUM CHLORIDE 10 MEQ: 7.46 INJECTION, SOLUTION INTRAVENOUS at 16:59

## 2024-09-05 RX ADMIN — POTASSIUM CHLORIDE 10 MEQ: 7.46 INJECTION, SOLUTION INTRAVENOUS at 21:18

## 2024-09-05 RX ADMIN — SOTALOL HYDROCHLORIDE 80 MG: 80 TABLET ORAL at 20:22

## 2024-09-05 RX ADMIN — MAGNESIUM SULFATE HEPTAHYDRATE 4000 MG: 4 INJECTION, SOLUTION INTRAVENOUS at 09:48

## 2024-09-05 RX ADMIN — ATORVASTATIN CALCIUM 80 MG: 40 TABLET, FILM COATED ORAL at 20:22

## 2024-09-05 RX ADMIN — KETOROLAC TROMETHAMINE 15 MG: 15 INJECTION, SOLUTION INTRAMUSCULAR; INTRAVENOUS at 12:40

## 2024-09-05 RX ADMIN — POTASSIUM CHLORIDE 10 MEQ: 7.46 INJECTION, SOLUTION INTRAVENOUS at 20:06

## 2024-09-05 RX ADMIN — HYDRALAZINE HYDROCHLORIDE 10 MG: 20 INJECTION INTRAMUSCULAR; INTRAVENOUS at 09:09

## 2024-09-05 RX ADMIN — LABETALOL HYDROCHLORIDE 10 MG: 5 INJECTION, SOLUTION INTRAVENOUS at 14:54

## 2024-09-05 RX ADMIN — POTASSIUM CHLORIDE 40 MEQ: 1500 TABLET, EXTENDED RELEASE ORAL at 09:17

## 2024-09-05 RX ADMIN — Medication 10 ML: at 20:22

## 2024-09-05 RX ADMIN — PROMETHAZINE HYDROCHLORIDE 6.25 MG: 25 INJECTION INTRAMUSCULAR; INTRAVENOUS at 19:12

## 2024-09-05 RX ADMIN — POTASSIUM CHLORIDE 10 MEQ: 7.46 INJECTION, SOLUTION INTRAVENOUS at 15:58

## 2024-09-05 RX ADMIN — IOPAMIDOL 75 ML: 755 INJECTION, SOLUTION INTRAVENOUS at 09:56

## 2024-09-05 RX ADMIN — POTASSIUM CHLORIDE 10 MEQ: 7.46 INJECTION, SOLUTION INTRAVENOUS at 18:44

## 2024-09-05 RX ADMIN — MAGNESIUM SULFATE HEPTAHYDRATE 2000 MG: 40 INJECTION, SOLUTION INTRAVENOUS at 15:56

## 2024-09-05 RX ADMIN — HYDRALAZINE HYDROCHLORIDE 10 MG: 20 INJECTION, SOLUTION INTRAMUSCULAR; INTRAVENOUS at 16:57

## 2024-09-05 RX ADMIN — ONDANSETRON 4 MG: 2 INJECTION INTRAMUSCULAR; INTRAVENOUS at 12:40

## 2024-09-05 ASSESSMENT — LIFESTYLE VARIABLES
HOW MANY STANDARD DRINKS CONTAINING ALCOHOL DO YOU HAVE ON A TYPICAL DAY: PATIENT DOES NOT DRINK
HOW MANY STANDARD DRINKS CONTAINING ALCOHOL DO YOU HAVE ON A TYPICAL DAY: PATIENT DOES NOT DRINK
HOW OFTEN DO YOU HAVE A DRINK CONTAINING ALCOHOL: NEVER
HOW OFTEN DO YOU HAVE A DRINK CONTAINING ALCOHOL: NEVER

## 2024-09-05 NOTE — H&P
Addended by: JIMENA KNIGHT on: 9/13/2023 09:09 AM     Modules accepted: Orders     [facility] Medicine History & Physical      PCP: Ramsey Weinberg Jr., MD    Date of Admission: 9/5/2024    Date of Service: Pt seen/examined on 09/05/24     Chief Complaint:    Chief Complaint   Patient presents with    Dizziness     On 21st pt felt dizzy and lightheaded, then had trouble speaking. Has remained dizzy and lightheaded at times.  Periodically will have trouble speaking.          History Of Present Illness:      The patient is a 83 y.o. female with PMH of PAF s/p ablation, hypothyroidism, medication induced hepatic cirrhosis, renal infarct, tularemia, GERD, anxiety, and depression who [place] [facility] [place] of [place]iness. Pt states that she had an episode of slurred speech and dizziness on August 21st. She states that she has had intermittent dizziness, slurred speech, and has felt off balance since then. She states she lost her son to cancer around the same time of her first episode and was told that her sx were due to this but a friend recommended she come to the ER to be evaluated. Pt states she had a headache yesterday as well. She states that she takes Eliquis daily for afib (but told the ER she is not currently on AC per the ED note). She denies any extremity weakness, sensory deficits, vision changes, CP, SOB, fever, chills, abd pain, or recent fall.    Past Medical History:        Diagnosis Date    Atrial fibrillation (HCC)     Depression        Past Surgical History:        Procedure Laterality Date    ABLATION OF DYSRHYTHMIC FOCUS      CHOLECYSTECTOMY      COLONOSCOPY N/A 3/26/2021    COLONOSCOPY POLYPECTOMY ABLATION performed by Damon Cornejo MD at [facility] ENDOSCOPY    HYSTERECTOMY      JOINT REPLACEMENT      right knee    UPPER GASTROINTESTINAL ENDOSCOPY N/A 3/26/2021    EGD BIOPSY performed by Damon Cornejo MD at [facility] ENDOSCOPY       Medications Prior to Admission:    Prior to Admission medications    Medication Sig Start Date End Date Taking? Authorizing Provider   omeprazole (PRILOSEC)

## 2024-09-05 NOTE — PROGRESS NOTES
Inpatient Occupational Therapy Evaluation and Treatment    Unit: PCU  Date:  9/5/2024  Patient Name:    Mague Tomlin  Admitting diagnosis:  Hypokalemia [E87.6]  Hypomagnesemia [E83.42]  Dizziness [R42]  Cerebrovascular accident (CVA), unspecified mechanism (HCC) [I63.9]  Hypertension, unspecified type [I10]  Admit Date:  9/5/2024  Precautions/Restrictions/WB Status/ Lines/ Wounds/ Oxygen: Fall risk, Bed/chair alarm, Lines (IV), and Telemetry    Pt seen for cotreatment this date due to patient safety, patient endurance, and limited functional status information    Treatment Time:  1736-7389  Treatment Number:  1  Timed Code Treatment Minutes: 25 minutes  Total Treatment Minutes:  35  minutes    Patient Goals for Therapy: none stated          Discharge Recommendations: Home with PRN assist  DME needs for discharge: Needs Met       Therapy recommendations for staff:   Stand by assist for transfers with use of gait belt to/from chair  to/from bathroom    History of Present Illness: Per Dr. Lipscomb ED notes 9/5/24:  \"83 y.o. female who presents for evaluation of lightheadedness, slurred speech.  Patient states that she initially had an episode of slurred speech on August 21.  She states since that time, she felt like her speech has not been the same and still slightly slurred.  She states that she has been dizzy, lightheaded and feeling off balance.  She states that she recently went through the loss of her son to cancer and this has been really tough on her, the symptoms started about 5 minutes before he had passed away.  She states yesterday, she had a headache.  No vision changes.  She states that she was urged by a friend to be evaluated no recent falls or head injuries.  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 however is not currently on anticoagulation\"  CT head shows no acute intracranial abnormality  MRI and neurology consult pending    Preadmission Environment:   Pt  lives with                                         with family (grandkids and son)  Home environment:                            one story home  Steps to enter first floor:                     1 steps to enter and no handrail  Steps to second floor/basement:        N/A  Laundry:                                              Pt completes  Bathroom:                                           walk in shower, grab bars in shower, and raised toilet seat w/ arms  Pt sleeps in a:                                     Flat bed  Equipment owned:                              rollator and BSC     Preadmission Status:  Pt able to drive: Yes  Pt fully independent with ADLs: Yes  Pt receives assistance from family for: Independent PTA  Pt independent for functional transfers and utilized No Device for mobility in home and No Device out in community  History of falls:            Yes, reports fall where she caught herself 4 days ago  Home Health Services:None    AM-PAC Score: AM-PAC Inpatient Daily Activity Raw Score: 21     Subjective:  Patient sitting EOB with hospital staff in room.   Pt agreeable to this OT session.     Cognition:    A&O x4   Able to follow 2 step commands    Pain:   No  Location: N/A  Rating: NA /10  Pain Medicine Status: No request made    Activity Tolerance:   Pt completed therapy session with no adverse symptoms     BP (mmHg) HR (bpm) SpO2 (%) on RA Comments   Supine at rest       Seated at /86      Standing       End of session 147/81        Objective:  Does this pt have an acute or acute on chronic diagnosis of CHF? No    Upper Extremity ROM:    Appears WNL    Dominant Hand: Right    Upper Extremity Strength:    BUE strength WFL, but not formally assessed w/ MMT    Upper Extremity Sensation:    NT    Upper Extremity Proprioception:  NT    Coordination:  WFL tip to tip pinch bilaterally    Neuro:  Coordination Testing  Blank box below indicates item is NOT TESTED  Finger to Nose:        [x]WNL

## 2024-09-05 NOTE — CARE COORDINATION
Case Management Assessment  Initial Evaluation    Date/Time of Evaluation: 9/5/2024 2:41 PM  Assessment Completed by: Joel Gonsalves RN    If patient is discharged prior to next notation, then this note serves as note for discharge by case management.    Patient Name: Mague Tomlin                   YOB: 1940  Diagnosis: Hypokalemia [E87.6]  Hypomagnesemia [E83.42]  Dizziness [R42]  Cerebrovascular accident (CVA), unspecified mechanism (HCC) [I63.9]  Hypertension, unspecified type [I10]                   Date / Time: 9/5/2024  8:13 AM    Patient Admission Status: Inpatient   Readmission Risk (Low < 19, Mod (19-27), High > 27): No data recorded  Current PCP: Ramsey Weinberg Jr., MD  PCP verified by CM? Yes    Chart Reviewed: Yes      History Provided by: Patient  Patient Orientation: Alert and Oriented    Patient Cognition: Alert    Hospitalization in the last 30 days (Readmission):  No    If yes, Readmission Assessment in  Navigator will be completed.    Advance Directives:      Code Status: Full Code   Patient's Primary Decision Maker is: Legal Next of Kin    Primary Decision Maker: Holly Goldman - Child - [phone]    Discharge Planning:    Patient lives with: Children Type of Home: House  Primary Care Giver: Self  Patient Support Systems include: Children   Current Financial resources: Medicare  Current community resources: None  Current services prior to admission: Durable Medical Equipment            Current DME: Wheelchair            Type of Home Care services:  None    ADLS  Prior functional level: Independent in ADLs/IADLs  Current functional level: Independent in ADLs/IADLs    PT AM-PAC:   /24  OT AM-PAC:   /24    Family can provide assistance at DC: Yes  Would you like Case Management to discuss the discharge plan with any other family members/significant others, and if so, who? No  Plans to Return to Present Housing: Yes  Other Identified Issues/Barriers to RETURNING to current housing:

## 2024-09-05 NOTE — ED PROVIDER NOTES
[facility] ED  EMERGENCY DEPARTMENT ENCOUNTER        Patient Name: Mague Tomlin  MRN: [id number]  Birthdate 1940  Date of evaluation: 9/5/2024  Provider: Chad Lipscomb MD  PCP: Ramsey Weinberg Jr., MD  Note Started: 8:12 AM EDT 9/5/24    CHIEF COMPLAINT       Dizziness (On 21st pt felt dizzy and lightheaded, then had trouble speaking. Has remained dizzy and lightheaded at times.  Periodically will have trouble speaking. )      HISTORY OF PRESENT ILLNESS: 1 or more Elements     History from : Patient    Limitations to history : None    Mague Tomlin is a 83 y.o. female who presents for evaluation of lightheadedness, slurred speech.  Patient states that she initially had an episode of slurred speech on August 21.  She states since that time, she felt like her speech has not been the same and still slightly slurred.  She states that she has been dizzy, lightheaded and feeling off balance.  She states that she recently went through the loss of her son to cancer and this has been really tough on her, the symptoms started about 5 minutes before he had passed away.  She states yesterday, she had a headache.  No vision changes.  She states that she was urged by a friend to be evaluated no recent falls or head injuries.  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    REVIEW OF SYSTEMS :      Review of Systems    Positives and Pertinent negatives as per HPI.     SURGICAL HISTORY     Past Surgical History:   Procedure Laterality Date    ABLATION OF DYSRHYTHMIC FOCUS      CHOLECYSTECTOMY      COLONOSCOPY N/A 3/26/2021    COLONOSCOPY POLYPECTOMY ABLATION performed by Damon Cornejo MD at [facility] ENDOSCOPY    HYSTERECTOMY      JOINT REPLACEMENT      right knee    UPPER GASTROINTESTINAL ENDOSCOPY N/A 3/26/2021    EGD BIOPSY performed  her speech is not overtly slurred at this time, she has NIH stroke scale score of 0.  Will obtain laboratory evaluation, CT head, CT imaging of head and neck.  Her symptoms are concerning for CVA.  She is also hypertensive on arrival to the 240s over 70s.  She will Given dose of hydralazine for initial management as pulse is in the 60s.  Unclear whether her initial symptoms were due to grief reaction however as they have been intermittent, will pursue CVA workup.    The differential diagnosis associated with the patient's presentation includes, but is not limited to: CVA, hypertensive emergency, large vessel occlusion, TIA    There is no leukocytosis.  INR is normal.  Potassium is low at 2.9.  Magnesium low at 1.0 this has been replaced as well    CONSULTS: (Who and What was discussed)  None    Discussion with Other Professionals : Admitting Team      Management of the patient was discussed with admitting hospitalist.  Patient's blood pressure is mildly improved after hydralazine.  CT head, CT imaging without acute abnormality.  Because of her persistent neurological symptoms, patient recommended for admission for neurology evaluation and MRI.  Patient is agreeable with this plan.    Social Determinants : None    Patient's care impacted by chronic condition(s):   Past Medical History:   Diagnosis Date    Atrial fibrillation (HCC)     Depression          Records Reviewed : None    Clinical information obtained from an independent historian.     Disposition Considerations (include 1 Tests not done, Shared Decision Making, Pt Expectation of Test or Tx.):     I am the Primary Clinician of Record.    FINAL IMPRESSION      1. Cerebrovascular accident (CVA), unspecified mechanism (HCC)    2. Hypomagnesemia    3. Hypokalemia    4. Dizziness    5. Hypertension, unspecified type          DISPOSITION/PLAN     DISPOSITION Decision To Admit 09/05/2024 11:27:28 AM  Condition at Disposition: Stable      PATIENT REFERRED TO:  No

## 2024-09-05 NOTE — PROGRESS NOTES
Inpatient Physical Therapy Evaluation & Treatment    Unit: PCU  Date:  9/5/2024  Patient Name:    Mague Tomlin  Admitting diagnosis:  Hypokalemia [E87.6]  Hypomagnesemia [E83.42]  Dizziness [R42]  Cerebrovascular accident (CVA), unspecified mechanism (HCC) [I63.9]  Hypertension, unspecified type [I10]  Admit Date:  9/5/2024  Precautions/Restrictions/WB Status/ Lines/ Wounds/ Oxygen: Fall risk, Bed/chair alarm, Lines (IV), and Telemetry      Pt seen for cotreatment this date due to patient safety, patient endurance, and limited functional status information    Treatment Time:  1450 - 1525  Treatment Number:  1   Timed Code Treatment Minutes: 25 minutes  Total Treatment Minutes:  35  minutes    Patient Stated Goals for Therapy: none stated          Discharge Recommendations: Home with PRN assistance  DME needs for discharge: Needs Met       Therapy recommendation for EMS Transport: NA    Therapy recommendations for staff:   Stand by assist for ambulation with use of No AD and gait belt to/from chair  to/from bathroom  within room  within halls    History of Present Illness:   83 y.o. female who presents for evaluation of lightheadedness, slurred speech.  Patient states that she initially had an episode of slurred speech on August 21.  She states since that time, she felt like her speech has not been the same and still slightly slurred.  She states that she has been dizzy, lightheaded and feeling off balance.  She states that she recently went through the loss of her son to cancer and this has been really tough on her, the symptoms started about 5 minutes before he had passed away.  She states yesterday, she had a headache.  No vision changes.  She states that she was urged by a friend to be evaluated no recent falls or head injuries.  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    Assessment  Pt seen today for physical therapy Evaluation & Treatment. Pt demonstrated decreased Activity tolerance. Pt seen today for physical therapy treatment in the acute care setting. Prior to admission Pt indep with all funcitonal mobility and has not been using AD for mobility, however has used rollator the past 2 weeks since feeling weaker.  Today's treatment consisted of bed mobility, transfers, and ambulation in order to address the above impairments and functional limitations. Pt is SBA for all fucntional mobility both with rollator or without. Continued skilled physical therapy is necessary to address the above impairments, in order to help the patient make a full return to PLOF without complaints of pain, difficulty or compensation      Recommending Home PRN assist upon discharge as patient functioning close to baseline level and would benefit from continued therapy services    Goals :   To be met in 3 visits:  1). Independent with LE Ex x 10 reps  2). Sit to/from stand: Independent  3). Bed to chair: Independent  4). CHF goal: N/A    To be met in 6 visits:  1).  Supine to/from sit: Independent  2).  Gait: Ambulate 150 ft.  with Independent and use of gait belt and Rollator  3).  Tolerate B LE exercises 3 sets of 10-15 reps  4).  Ascend/descend 1 steps with Independent with use of hand rail bilateral and No AD and gait belt  5).  Ascend/descend curb step with Independent with use of no handrail and gait belt and Rollator    Rehabilitation Potential: Good  Strengths for achieving goals include:   Pt motivated, PLOF, Family Support, and Pt cooperative   Barriers to achieving goals include:    No Barriers    Plan    To be seen 5-7 x/ week  while in acute care setting for therapeutic exercises, bed mobility, transfers, progressive gait training, balance training, and family/patient education.    Signature: Noelle Hartley, PT

## 2024-09-05 NOTE — PROGRESS NOTES
Patient admitted to room 11  from ER. Patient oriented to room, call light, bed rails, phone, lights and bathroom. Patient instructed about the schedule of the day including: vital sign frequency, lab draws, possible tests, frequency of MD and staff rounds, daily weights, I &O's and prescribed diet. General Telemetry box in place, patient aware of placement and reason. Bed locked, in lowest position, side rails up 2/4, call light within reach.        Recliner Assessment  Patient is able to demonstrate the ability to move from a reclining position to an upright position within the recliner.       4 Eyes Skin Assessment     NAME:  Mague Athens  YOB: 1940  MEDICAL RECORD NUMBER:  [id number]    The patient is being assessed for  Admission    I agree that at least one RN has performed a thorough Head to Toe Skin Assessment on the patient. ALL assessment sites listed below have been assessed.      Areas assessed by both nurses:    Head, Face, Ears, Shoulders, Back, Chest, Arms, Elbows, Hands, Sacrum. Buttock, Coccyx, Ischium, and Legs. Feet and Heels        Does the Patient have a Wound? No noted wound(s)       Jose Prevention initiated by RN: No  Wound Care Orders initiated by RN: No    Pressure Injury (Stage 3,4, Unstageable, DTI, NWPT, and Complex wounds) if present, place Wound referral order by RN under : No    New Ostomies, if present place, Ostomy referral order under : No     Nurse 1 eSignature: Electronically signed by Effie More RN on 9/5/24 at 4:21 PM EDT    **SHARE this note so that the co-signing nurse can place an eSignature**    Nurse 2 eSignature: {Esignature:[id number]}

## 2024-09-05 NOTE — PROGRESS NOTES
Bedside report and transfer of care given to TERRANCE Reddy. Pt currently resting in bed with the call light within reach. Pt denies any other care needs at this time. Pt stable at this time.

## 2024-09-05 NOTE — ACP (ADVANCE CARE PLANNING)
Advance Care Planning     General Advance Care Planning (ACP) Conversation    Date of Conversation: 9/5/2024  Conducted with: Patient with Decision Making Capacity  Other persons present: None    Healthcare Decision Maker:   Primary Decision Maker: Holly Goldman - Child - [phone]       Content/Action Overview:  DECLINED ACP Conversation - will revisit periodically  Reviewed DNR/DNI and patient elects Full Code (Attempt Resuscitation)        Length of Voluntary ACP Conversation in minutes:  <16 minutes (Non-Billable)    Joel Gonsalves RN

## 2024-09-06 ENCOUNTER — APPOINTMENT (OUTPATIENT)
Dept: MRI IMAGING | Age: 84
DRG: 149 | End: 2024-09-06
Payer: MEDICARE

## 2024-09-06 PROBLEM — H81.12 BENIGN PAROXYSMAL POSITIONAL VERTIGO OF LEFT EAR: Status: ACTIVE | Noted: 2024-09-06

## 2024-09-06 LAB
ANION GAP SERPL CALCULATED.3IONS-SCNC: 10 MMOL/L (ref 3–16)
BUN SERPL-MCNC: 10 MG/DL (ref 7–20)
CALCIUM SERPL-MCNC: 9.2 MG/DL (ref 8.3–10.6)
CHLORIDE SERPL-SCNC: 104 MMOL/L (ref 99–110)
CHOLEST SERPL-MCNC: 172 MG/DL (ref 0–199)
CO2 SERPL-SCNC: 25 MMOL/L (ref 21–32)
CREAT SERPL-MCNC: 0.7 MG/DL (ref 0.6–1.2)
DEPRECATED RDW RBC AUTO: 14.3 % (ref 12.4–15.4)
EST. AVERAGE GLUCOSE BLD GHB EST-MCNC: 111.2 MG/DL
GFR SERPLBLD CREATININE-BSD FMLA CKD-EPI: 86 ML/MIN/{1.73_M2}
GLUCOSE SERPL-MCNC: 112 MG/DL (ref 70–99)
HBA1C MFR BLD: 5.5 %
HCT VFR BLD AUTO: 39.4 % (ref 36–48)
HDLC SERPL-MCNC: 31 MG/DL (ref 40–60)
HGB BLD-MCNC: 13.6 G/DL (ref 12–16)
LDLC SERPL CALC-MCNC: 113 MG/DL
MCH RBC QN AUTO: 29.9 PG (ref 26–34)
MCHC RBC AUTO-ENTMCNC: 34.6 G/DL (ref 31–36)
MCV RBC AUTO: 86.4 FL (ref 80–100)
PLATELET # BLD AUTO: 176 K/UL (ref 135–450)
PMV BLD AUTO: 7.7 FL (ref 5–10.5)
POTASSIUM SERPL-SCNC: 4.1 MMOL/L (ref 3.5–5.1)
RBC # BLD AUTO: 4.56 M/UL (ref 4–5.2)
SODIUM SERPL-SCNC: 139 MMOL/L (ref 136–145)
TRIGL SERPL-MCNC: 139 MG/DL (ref 0–150)
TSH SERPL DL<=0.005 MIU/L-ACNC: 2.07 UIU/ML (ref 0.27–4.2)
VLDLC SERPL CALC-MCNC: 28 MG/DL
WBC # BLD AUTO: 9.1 K/UL (ref 4–11)

## 2024-09-06 PROCEDURE — 85027 COMPLETE CBC AUTOMATED: CPT

## 2024-09-06 PROCEDURE — 84443 ASSAY THYROID STIM HORMONE: CPT

## 2024-09-06 PROCEDURE — 70551 MRI BRAIN STEM W/O DYE: CPT

## 2024-09-06 PROCEDURE — 92610 EVALUATE SWALLOWING FUNCTION: CPT

## 2024-09-06 PROCEDURE — 83036 HEMOGLOBIN GLYCOSYLATED A1C: CPT

## 2024-09-06 PROCEDURE — 99232 SBSQ HOSP IP/OBS MODERATE 35: CPT | Performed by: INTERNAL MEDICINE

## 2024-09-06 PROCEDURE — 6370000000 HC RX 637 (ALT 250 FOR IP): Performed by: INTERNAL MEDICINE

## 2024-09-06 PROCEDURE — 92526 ORAL FUNCTION THERAPY: CPT

## 2024-09-06 PROCEDURE — G0378 HOSPITAL OBSERVATION PER HR: HCPCS

## 2024-09-06 PROCEDURE — 2060000000 HC ICU INTERMEDIATE R&B

## 2024-09-06 PROCEDURE — 97110 THERAPEUTIC EXERCISES: CPT

## 2024-09-06 PROCEDURE — 36415 COLL VENOUS BLD VENIPUNCTURE: CPT

## 2024-09-06 PROCEDURE — 6370000000 HC RX 637 (ALT 250 FOR IP)

## 2024-09-06 PROCEDURE — 97116 GAIT TRAINING THERAPY: CPT

## 2024-09-06 PROCEDURE — 99222 1ST HOSP IP/OBS MODERATE 55: CPT | Performed by: STUDENT IN AN ORGANIZED HEALTH CARE EDUCATION/TRAINING PROGRAM

## 2024-09-06 PROCEDURE — 80061 LIPID PANEL: CPT

## 2024-09-06 PROCEDURE — 2580000003 HC RX 258

## 2024-09-06 PROCEDURE — 80048 BASIC METABOLIC PNL TOTAL CA: CPT

## 2024-09-06 RX ORDER — LORAZEPAM 0.5 MG/1
0.5 TABLET ORAL EVERY 12 HOURS PRN
Status: DISCONTINUED | OUTPATIENT
Start: 2024-09-06 | End: 2024-09-07 | Stop reason: HOSPADM

## 2024-09-06 RX ORDER — NIFEDIPINE 30 MG/1
30 TABLET, EXTENDED RELEASE ORAL DAILY
Status: DISCONTINUED | OUTPATIENT
Start: 2024-09-06 | End: 2024-09-07 | Stop reason: HOSPADM

## 2024-09-06 RX ORDER — URSODIOL 250 MG/1
250 TABLET, FILM COATED ORAL 3 TIMES DAILY
Status: DISCONTINUED | OUTPATIENT
Start: 2024-09-06 | End: 2024-09-07 | Stop reason: HOSPADM

## 2024-09-06 RX ORDER — LISINOPRIL 5 MG/1
5 TABLET ORAL DAILY
Status: DISCONTINUED | OUTPATIENT
Start: 2024-09-06 | End: 2024-09-07 | Stop reason: HOSPADM

## 2024-09-06 RX ADMIN — SERTRALINE 50 MG: 50 TABLET, FILM COATED ORAL at 08:56

## 2024-09-06 RX ADMIN — LISINOPRIL 5 MG: 5 TABLET ORAL at 08:55

## 2024-09-06 RX ADMIN — SOTALOL HYDROCHLORIDE 80 MG: 80 TABLET ORAL at 20:09

## 2024-09-06 RX ADMIN — Medication 10 ML: at 20:10

## 2024-09-06 RX ADMIN — URSODIOL 250 MG: 250 TABLET, FILM COATED ORAL at 09:00

## 2024-09-06 RX ADMIN — SOTALOL HYDROCHLORIDE 80 MG: 80 TABLET ORAL at 08:55

## 2024-09-06 RX ADMIN — ATORVASTATIN CALCIUM 80 MG: 40 TABLET, FILM COATED ORAL at 20:09

## 2024-09-06 RX ADMIN — APIXABAN 5 MG: 5 TABLET, FILM COATED ORAL at 20:09

## 2024-09-06 RX ADMIN — URSODIOL 250 MG: 250 TABLET, FILM COATED ORAL at 20:10

## 2024-09-06 RX ADMIN — PANTOPRAZOLE SODIUM 40 MG: 40 TABLET, DELAYED RELEASE ORAL at 06:58

## 2024-09-06 RX ADMIN — LEVOTHYROXINE SODIUM 50 MCG: 50 TABLET ORAL at 06:58

## 2024-09-06 RX ADMIN — URSODIOL 250 MG: 250 TABLET, FILM COATED ORAL at 13:32

## 2024-09-06 RX ADMIN — NIFEDIPINE 30 MG: 30 TABLET, FILM COATED, EXTENDED RELEASE ORAL at 13:31

## 2024-09-06 RX ADMIN — APIXABAN 5 MG: 5 TABLET, FILM COATED ORAL at 09:02

## 2024-09-06 RX ADMIN — ASPIRIN 81 MG: 81 TABLET, CHEWABLE ORAL at 09:04

## 2024-09-06 NOTE — CONSULTS
[facility] Consult Note           Reason for Consult:  Hypertensive urgency  Requesting Physician:  Dr. Quach    Chief Complaint:  Dizziness  History Obtained From:  patient, electronic medical record    History of Present Ilness:    83 year old female with HTN, hx of right renal infarct, Atrial fibrillation s/p ablation, Hypothyroidism, Liver cirrhosis (med-induced) presents with dizziness.  We have been asked to assist in further mgmt of her HTN urgency.    Has not been on any specific BP medications recently.  BP's as high as ~240's/80's on admission, trending better now with lisinopril, nifedipine, & prn IV hydralazine/labetalol.  Has been under more stressors with death of her son.  +weak, +lightheaded, headaches, +difficulty with speech last month - better now, no shortness of breath, abdominal pain.    Past Medical History:        Diagnosis Date    Atrial fibrillation (HCC)     BPPV (benign paroxysmal positional vertigo), left     Depression     Hypertension        Past Surgical History:        Procedure Laterality Date    ABLATION OF DYSRHYTHMIC FOCUS      CHOLECYSTECTOMY      COLONOSCOPY N/A 3/26/2021    COLONOSCOPY POLYPECTOMY ABLATION performed by Damon Cornejo MD at [facility] ENDOSCOPY    HYSTERECTOMY (CERVIX STATUS UNKNOWN)      JOINT REPLACEMENT      right knee    UPPER GASTROINTESTINAL ENDOSCOPY N/A 3/26/2021    EGD BIOPSY performed by Damon Cornejo MD at [facility] ENDOSCOPY       Home Medications:    No current facility-administered medications on file prior to encounter.     Current Outpatient Medications on File Prior to Encounter   Medication Sig Dispense Refill    omeprazole (PRILOSEC) 20 MG delayed release capsule Take 2 capsules by mouth daily      sotalol (BETAPACE) 80 MG tablet Take 1 tablet by mouth 2 times daily      levothyroxine (SYNTHROID) 50 MCG tablet Take 1 tablet by mouth daily      ursodiol (EMILY) 250 MG tablet Take 1 tablet by mouth 3 times  daily      gabapentin (NEURONTIN) 100 MG capsule Take 1 capsule by mouth 3 times daily as needed (burning pain to face) for up to 5 days. 15 capsule 0    zolpidem (AMBIEN) 5 MG tablet Take 1 tablet by mouth nightly as needed for Sleep.         Allergies:  Quinidex [quinidine]    Social History:    Social History     Socioeconomic History    Marital status:      Spouse name: Not on file    Number of children: Not on file    Years of education: Not on file    Highest education level: Not on file   Occupational History    Not on file   Tobacco Use    Smoking status: Never    Smokeless tobacco: Never   Vaping Use    Vaping status: Never Used   Substance and Sexual Activity    Alcohol use: Never    Drug use: Never    Sexual activity: Not Currently     Partners: Male   Other Topics Concern    Not on file   Social History Narrative    Not on file     Social Determinants of Health     Financial Resource Strain: Not on file   Food Insecurity: No Food Insecurity (9/5/2024)    Hunger Vital Sign     Worried About Running Out of Food in the Last Year: Never true     Ran Out of Food in the Last Year: Never true   Transportation Needs: No Transportation Needs (9/5/2024)    PRAPARE - Transportation     Lack of Transportation (Medical): No     Lack of Transportation (Non-Medical): No   Physical Activity: Not on file   Stress: Not on file   Social Connections: Not on file   Intimate Partner Violence: Unknown (1/20/2024)    Received from [facility]     Interpersonal Safety     Feel physically or emotionally unsafe where currently live: Not on file     Harm by anyone: Not on file     Emotionally Harmed: Not on file   Housing Stability: Low Risk  (9/5/2024)    Housing Stability Vital Sign     Unable to Pay for Housing in the Last Year: No     Number of Times Moved in the Last Year: 1     Homeless in the Last Year: No       Family History:   Family History   Problem Relation Age of Onset    No Known Problems Mother     No Known  kidney parenchyma & sizes with prior history of right renal infarct  - Trend labs, bp's, weights, & urine output      Thank you for the consultation.  Please do not hesitate to call with questions.    ____________________________________  Ran Perdue MD  [facility]  [email or web address]  Office: [phone]

## 2024-09-06 NOTE — CONSULTS
Inpatient Neurology consult        [facility]  1940    Date of Service: 9/6/2024    Referring Physician: Elizabeth Quach DO      Reason for the consult and CC:     HPI:   The patient is a 83 y.o. female with a past medical history of PAF s/p ablation, hypothyroidism, hepatic cirrhosis (medication induced), renal infarct, GERD, anxiety, and depression originally presenting to the hospital for concerns of intermittent dizziness. Patient states her son's cancer worsened in the beginning of August and she began having dizziness and feel off balance at that time. When her son passed away on August 21 her symptoms worsened and states 3 nights ago she had a brief episode of slurred speech and a headache described as \"feeling like her head was going to pop\". Patient states she believed her symptoms to be stress related and was encouraged by friends to go to the ER for further evaluation. Patient also noted to be hypertensive with a systolic of 240 on arrival with no known history of hypertension, hypokalemia, and hypomagnesemia. Neurology has been consulted for further evaluation and management of dizziness and possible slurred speech.      Constitutional:   Vitals:    09/05/24 2326 09/06/24 0019 09/06/24 0350 09/06/24 0855   BP: (!) 209/93 (!) 153/80 (!) 173/74 (!) 215/86   Pulse: 67  64 68   Resp: 18  16    Temp: 97.7 °F (36.5 °C)  98.2 °F (36.8 °C)    TempSrc: Oral  Oral    SpO2: 98%  98%    Weight:   82 kg (180 lb 11.2 oz)    Height:             I personally reviewed and updated social history, past medical history, medications, allergy, surgical history, and family history as documented in the patient's electronic health records.       ROS: 10-14 ROS reviewed with the patient/nurse/family which were unremarkable except mentioned in H&P.    General appearance:  Normal development and appear in no acute distress.   Mental Status:   Oriented to person, place, problem, and time.   who  has a past medical history of Atrial fibrillation (HCC), BPPV (benign paroxysmal positional vertigo), left, Depression, and Hypertension. Neurology consulted for dizziness.     She has 2-4 weeks duration brief dizzy spells lasting 1-15 minutes that only occur when seated or standing and are provoked by movement in particular sitting up in the morning from a lying position in bed. Denies hearing loss, diplopia, vision loss. Denies auditory trigger. History of rare migraine with prominent nausea but never dizziness. The dizzy spells were prominent when she presented to the hospital but have improved since SBP has been reduced.     My examination demonstrated the following:   Mental Status:   Alert. Oriented to time, place, and person.   Attention span and concentration normal.   Language expression and comprehension normal.   No hemineglect.     Cranial Nerves:  II: Visual fields were normal to confrontation and visual acuity was good for face to face and TV distance. PERRL.   III, IV, VI: Ocular motility was full and there was no nystagmus. Head thrust normal. No skew deviation.    V: Facial sensation was normal. Jaw opening was symmetric.    VII: Facial strength symmetrically intact and the speech was clear.   VIII: Hearing acuity was normal. [place] Hallpike provoked vertical nystagmus and dizziness with left head turn.    IX, X: Palate and cough were normal.  XI: Shoulder shrug and head turning strength were normal.    XII: Tongue protrusion normal. No atrophy or fasciculations.     Motor: Normal muscle bulk and tone. Strength 5/5 symmetric distal and proximal bilateral upper and lower extremities.     Sensation: normal to light touch in bilateral upper and lower extremities.     Deep tendon reflexes: 2/4 symmetric distal and proximal bilateral upper and lower extremities save for 1/4 Achilles symmetric.Toes down going bilateral.      Coordination: No tremor, myoclonus observed. Finger nose finger testing  accurate. No bradykinesia or dysdiadokinesia. Wide based gait (chronic, uses walker long duration at baseline). Able to walk tandem with minimal support. Romberg negative.      DATA REVIEWED:   I independently interpreted the 9/6 brain MRI which did not reveal acute infarct or radiographic evidence of PRES.  I independently interpreted the 9/5 CTA head and neck which did not reveal significant posterior circulation stenosis, occlusion, or abnormality otherwise.   I independently interpreted the 9/5 NCHCT which was unremarkable  I reviewed the labs over the past 24 hours - resolved hypokalemia otherwise unremarkable (initial labs were falsely abnormal per report)    ASSESSMENT  Left BPPV. +[place] Hallpike. Dizzy spells provoked by position change lasting 1-15 minutes somewhat longer than expected for BPPV. No evidence of infarct or PRES. Perhaps exacerbated by hypertensive emergency. If this does not resolve with Epley maneuver and vestibular therapy, then vestibular migraine overlying BPPV could be considered.   Atrial fibrillation. Her dizziness is not caused by TIA/stroke and she has no history of this. Treat for primary prevention of cerebrovascular disease.     RECOMMENDATIONS:   Vestibular rehab referral outpatient  No further neurological work up at this time  I will arrange follow up with me in neurology clinic [place] location in ~3 mo  Neurology will sign off but please contact me if there are additional questions/issues.    Electronically signed by Marcelo Cruz MD on 9/6/2024 at 3:22 PM

## 2024-09-06 NOTE — FLOWSHEET NOTE
09/05/24 2109   Vital Signs   BP (!) 208/93   MAP (Calculated) 131     PRN labetalol given for elevated BP.  Will recheck. MRI check list reviewed with Pt.  Denies needs at this time.  Call light within reach.

## 2024-09-06 NOTE — FLOWSHEET NOTE
09/06/24 0019   Vital Signs   BP (!) 153/80   MAP (Calculated) 104   BP Location Right upper arm   BP Method Automatic   Patient Position Semi fowlers     Pt reassessment complete.  Pt's BP has improved.  Lying quietly in bed.  Respirations easy and unlabored.  Denies needs. Call light within reach.

## 2024-09-06 NOTE — PROGRESS NOTES
Speech Language Pathology  Swallowing Disorders and Dysphagia  Clinical Bedside Swallow Assessment  Facility/Department: [facility] [place] PCU TELEMETRY        Instrumentation: Not clinically indicated at this time  Diet recommendation: IDDSI 7 Regular Solids; IDDSI 0 Thin Liquids; Meds whole with thin liquids  Risk management: upright for all intake, stay upright for at least 30 mins after intake, small bites/sips, oral care 2-3x/day to reduce adverse affects in the event of aspiration, increase physical mobility as able, slow rate of intake, general GERD precautions, general aspiration precautions, and hold PO and contact SLP if s/s of aspiration or worsening respiratory status develop.    No further ST recommended at this time. Please re-consult if new ST needs arise.    NAME:Mague Sparta  : 1940 (83 y.o.)   MRN: [id number]  ROOM: /0324-01  ADMISSION DATE: 2024  PATIENT DIAGNOSIS(ES): Hypokalemia [E87.6]  Hypomagnesemia [E83.42]  Dizziness [R42]  Cerebrovascular accident (CVA), unspecified mechanism (HCC) [I63.9]  Hypertension, unspecified type [I10]  Chief Complaint   Patient presents with    Dizziness     On  pt felt dizzy and lightheaded, then had trouble speaking. Has remained dizzy and lightheaded at times.  Periodically will have trouble speaking.      Patient Active Problem List    Diagnosis Date Noted    Dizziness 2024    Hypertensive urgency 2024    Hypokalemia 2024    Hypomagnesemia 2024    Hypothyroidism 2024    GERD (gastroesophageal reflux disease) 2024    Kidney infarction (HCC)     Leukocytosis     Primary hypertension     Abdominal pain 2021    Renal infarct (HCC) 2021    Hypoxia 2021    PAF (paroxysmal atrial fibrillation) (HCC) 2021     Past Medical History:   Diagnosis Date    Atrial fibrillation (HCC)     Depression      Past Surgical History:   Procedure Laterality Date    ABLATION OF DYSRHYTHMIC FOCUS      CHOLECYSTECTOMY  function, identify signs and symptoms of aspiration and make recommendations regarding safe dietary consistencies, effective compensatory strategies, and safe eating environment.    Assessment    Medical record review/interview: Per MD H&P: \" The patient is a 83 y.o. female with PMH of PAF s/p ablation, hypothyroidism, medication induced hepatic cirrhosis, renal infarct, tularemia, GERD, anxiety, and depression who presented to [facility] ED with complaint of dizziness. Pt states that she had an episode of slurred speech and dizziness on August 21st. She states that she has had intermittent dizziness, slurred speech, and has felt off balance since then. She states she lost her son to cancer around the same time of her first episode and was told that her sx were due to this but a friend recommended she come to the ER to be evaluated. Pt states she had a headache yesterday as well. She states that she takes Eliquis daily for afib (but told the ER she is not currently on AC per the ED note). She denies any extremity weakness, sensory deficits, vision changes, CP, SOB, fever, chills, abd pain, or recent fall\"         Patient Complaints:   None reported during pt interview    Baseline Method of Oral Meds: Whole with liquid     Admitting diagnoses and active problems as follows: Principal Problem:    Dizziness  Active Problems:    PAF (paroxysmal atrial fibrillation) (HCC)    Hypertensive urgency    Hypokalemia    Hypomagnesemia    Hypothyroidism    GERD (gastroesophageal reflux disease)  Resolved Problems:    * No resolved hospital problems. *      Additional Pertinent Information and Pt-Reported Symptoms/Complaints:   Pt alert and oriented x4.  Pt eats regular solids and thin liquids PTA.  Pt has no h/o dysphagia  Pt states speech and language symptoms have resolved.  Neurology suspects potassium levels may have contributed to pt's complaints.  Pt independent in oral care, feeding, and mobility.    Predisposing dysphagia risk  factors: GERD  Clinical signs of possible chronic dysphagia: N/A  Precipitating dysphagia risk factors: N/A    Code Status as listed in chart:  Full Code      Cranial nerve exam:   CN V (trigeminal): ophthalmic, maxillary, and mandibular facial sensation- WNL b/l  CN VII (facial): WNL b/l  CN IX/X (glossopharyngeal/vagus): MPT: Adequate; pitch range: Adequate; vocal quality: Adequate; cough: Strong-perceptually  CN XII (hypoglossal): WNL b/l    Laryngeal function exam:   Secretions: pt manages secretions independently  Vocal quality: See CN exam above  MPT: See CN exam above  S/Z ratio: 1:1  Pitch range: See CN exam above  Cough: See CN exam above    Oral Care Status:    Missing/broken teeth  Pt has upper denture and lower partial but neither re with pt at this time.    Oral Care Completed?   [x] Yes   [] No  Completed by pt after set up by SLP via pasted toothbrush    PO trials:   Baseline cough noted prior to PO trials? [] Yes   [x] No  Baseline SPO2%: 97  Baseline RR: 16  Supplemental O2 Status: RA     Ice: suspect functional A-P bolus transit, swallow timing subjectively appears timely, oral clearance grossly WFL, and no clinical s/s of aspiration    IDDSI 0 (thin):   - 5cc bolus (tsp): suspect functional A-P bolus transit, swallow timing subjectively appears timely, oral clearance grossly WFL, and no clinical s/s of aspiration  - Cup: no anterior bolus loss , suspect functional A-P bolus transit, swallow timing subjectively appears timely, oral clearance grossly WFL, and no clinical s/s of aspiration  - Straw: no anterior bolus loss , suspect functional A-P bolus transit, swallow timing subjectively appears timely, oral clearance grossly WFL, and no clinical s/s of aspiration    IDDSI 2 (mildly thick): DNT    IDDSI 3 (moderately thick): DNT    IDDSI 4 (puree): no anterior bolus loss , suspect functional A-P bolus transit, swallow timing subjectively appears timely, oral clearance grossly WFL, and no clinical

## 2024-09-06 NOTE — PROGRESS NOTES
Physical Therapy  [place] [facility] [place]  Daily Treatment Note  NAME: Mague Tomlin  : 1940  MRN: [id number]    Date of Service: 2024    Discharge Recommendations:  24 hour supervision or assist, Outpatient PT, Other (Comment) (vestibular rehab)   PT Equipment Recommendations  Equipment Needed: No    Patient Diagnosis(es): The primary encounter diagnosis was Cerebrovascular accident (CVA), unspecified mechanism (HCC). Diagnoses of Hypomagnesemia, Hypokalemia, Dizziness, Hypertension, unspecified type, and Benign paroxysmal positional vertigo of left ear were also pertinent to this visit.    Assessment  Assessment: Pt with improved tolerance to mobility. Progressing to Mod I for bed mobility and ambulation of 150' in hallway with support of rollator. No LOB, however required VC to stand closer to the walker. Pt returned to supine to rest at EOS.  Activity Tolerance: Patient tolerated treatment well  Equipment Needed: No    Plan  Physical Therapy Plan  General Plan: 3-5 times per week  Current Treatment Recommendations: Balance training;Gait training;Vestibular rehab;Transfer training;Equipment evaluation, education, & procurement;Patient/Caregiver education & training;Safety education & training      Subjective   Subjective  Subjective: Pt sitting EOB upon entry  Pain: denies  Orientation  Overall Orientation Status: Within Functional Limits  Cognition  Overall Cognitive Status: WFL    Objective  Vitals  Pulse: 67  SpO2: 98 %  Bed Mobility Training  Bed Mobility Training: Yes  Supine to Sit: Other (comment) (not witnessed. Assume Mod I)  Sit to Supine: Modified independent  Balance  Sitting: Intact  Standing: Intact;With support  Transfer Training  Transfer Training: Yes  Overall Level of Assistance: Supervision  Stand to Sit: Supervision  Stand Pivot Transfers: Supervision  Gait  Gait Training: Yes  Overall Level of Assistance: Stand-by assistance;Supervision  Distance (ft): 150

## 2024-09-06 NOTE — PROGRESS NOTES
Patient resting In bed and chair today. Neuro not concerned about stroke- neuro assessment passed.   Denies any needs at this time. Call light within reach.

## 2024-09-06 NOTE — PROGRESS NOTES
Consult has been called to Dr. Perdue on 9/6/24. Spoke with Belinda. 12:46 PM    Mary Moncada  9/6/2024

## 2024-09-06 NOTE — PROGRESS NOTES
Bedside report and transfer of care given to TERRNACE Ricks. Pt currently resting in bed with the call light within reach. Pt denies any other care needs at this time. Pt stable at this time.

## 2024-09-06 NOTE — PROGRESS NOTES
[facility]    Daily Progress Note for 2024 12:26 PM /0324-01  [facility] : 1940 Age: 83 y.o. Sex: female  Length of Stay:  1    Interval History:      CC: F/U Dizziness (On  pt felt dizzy and lightheaded, then had trouble speaking. Has remained dizzy and lightheaded at times.  Periodically will have trouble speaking. )    Subjective:       Patient had elevated BP intermittently in the past but thinks it has been normal for a long time. She has been under a lot of stress since the death of her son last month. Otherwise she feels like her health has been pretty good.     Objective:     Vitals:    24 0019 24 0350 24 0855 24 1145   BP: (!) 153/80 (!) 173/74 (!) 215/86 (!) 186/78   Pulse:  64 68 67   Resp:  16  16   Temp:  98.2 °F (36.8 °C)  98.4 °F (36.9 °C)   TempSrc:  Oral  Oral   SpO2:  98%  98%   Weight:  82 kg (180 lb 11.2 oz)     Height:              Intake/Output Summary (Last 24 hours) at 2024 1226  Last data filed at 2024 0856  Gross per 24 hour   Intake 0 ml   Output --   Net 0 ml     Body mass index is 29.17 kg/m².    Physical Exam:  General: Cooperative, pleasant/Ill appearing, on  Nasal cannula  HEENT:  Head: normocephalic,atraumatic, anicteric sclera, clear conjunctiva  Neck: Normal size, Jugular venous pulsations: normal  Respiratory:unlabored breathing, clear to auscultation with no crackles, wheezes rhonchi  Heart: Regular rate and rhythm, S1, S2-normal, No murmurs  Abdomen: soft, nondistended, nontender, normoactive bowel sounds,  Neurological/Psych: Alert and oriented times three, no focal neurological deficits, Mood and affect appropriate.  Skin: No obvious rashes    Extremities:  no edema, Pedal pulses 2+ bilaterally    Scheduled Medications:  sertraline, 50 mg, Daily  lisinopril, 5 mg, Daily  ursodiol, 250 mg, TID  NIFEdipine, 30 mg, Daily  sodium chloride flush, 5-40 mL, 2 times per day  atorvastatin, 80 mg,  CTA head and neck showed no acute abnormality   - MRI brain ordered - negative for acute stroke  - continue aspirin and statin  - PT/OT/SLP  - fall precautions  - neuro checks  - neuro consulted      HTN urgency, BPs still high overnight  - started low dose lisinopril  - Add Nifedipine 30 mg  - prn IV labetalol   - monitor BP   - has hx of renal infarct, will also consult nephrology     Hypokalemia   Hypomagnesemia   - stat labs ordered when potassium and magnesium resulted low, repeat much improved, most likely K <1.5 and Mg 0.80 were an error   - replacement protocol      PAF s/p ablation 7/2010  Secondary hypercoagulable state  - pt states she is compliant with her Eliquis (taking daily?)  - on sotalol      Hypothyroidism   - on synthroid      Medication induced hepatic cirrhosis   - on ursodiol      Hx of right renal infarct 12/2021  - it does not appear pt ever followed up with vascular outpt      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     DVT Prophylaxis: Eliquis    Management discussed with RN,     Electronically signed by: Elizabeth Quach DO, 9/6/2024 12:26 PM

## 2024-09-06 NOTE — FLOWSHEET NOTE
09/05/24 2008   Vital Signs   Temp 97.7 °F (36.5 °C)   Temp Source Oral   Pulse 71   Heart Rate Source Monitor   Respirations 18   BP (!) 192/77   MAP (Calculated) 115   BP Location Left upper arm   Patient Position Supine   Oxygen Therapy   SpO2 100 %   O2 Device None (Room air)     Pt assessment complete.  Pt lying in bed quietly.  Lung sounds clear.  Pt on room air.  Pt NSR per monitor with HR Of 71. IV potassium infusing at this time.  Nightly medications given.  Pt able to swallow pills with water without any difficulty.  Denies needs at this time.  Call light within reach.

## 2024-09-07 ENCOUNTER — APPOINTMENT (OUTPATIENT)
Dept: ULTRASOUND IMAGING | Age: 84
DRG: 149 | End: 2024-09-07
Payer: MEDICARE

## 2024-09-07 VITALS
SYSTOLIC BLOOD PRESSURE: 162 MMHG | DIASTOLIC BLOOD PRESSURE: 74 MMHG | RESPIRATION RATE: 16 BRPM | HEIGHT: 66 IN | TEMPERATURE: 98.3 F | WEIGHT: 180.7 LBS | OXYGEN SATURATION: 94 % | BODY MASS INDEX: 29.04 KG/M2 | HEART RATE: 72 BPM

## 2024-09-07 LAB
ANION GAP SERPL CALCULATED.3IONS-SCNC: 12 MMOL/L (ref 3–16)
BUN SERPL-MCNC: 12 MG/DL (ref 7–20)
CALCIUM SERPL-MCNC: 8.6 MG/DL (ref 8.3–10.6)
CHLORIDE SERPL-SCNC: 106 MMOL/L (ref 99–110)
CO2 SERPL-SCNC: 23 MMOL/L (ref 21–32)
CREAT SERPL-MCNC: 0.8 MG/DL (ref 0.6–1.2)
DEPRECATED RDW RBC AUTO: 14.1 % (ref 12.4–15.4)
GFR SERPLBLD CREATININE-BSD FMLA CKD-EPI: 74 ML/MIN/{1.73_M2}
GLUCOSE SERPL-MCNC: 112 MG/DL (ref 70–99)
HCT VFR BLD AUTO: 38.5 % (ref 36–48)
HGB BLD-MCNC: 13.4 G/DL (ref 12–16)
MAGNESIUM SERPL-MCNC: 1.7 MG/DL (ref 1.8–2.4)
MCH RBC QN AUTO: 30.4 PG (ref 26–34)
MCHC RBC AUTO-ENTMCNC: 34.9 G/DL (ref 31–36)
MCV RBC AUTO: 87.2 FL (ref 80–100)
PLATELET # BLD AUTO: 174 K/UL (ref 135–450)
PMV BLD AUTO: 7.7 FL (ref 5–10.5)
POTASSIUM SERPL-SCNC: 3.5 MMOL/L (ref 3.5–5.1)
RBC # BLD AUTO: 4.42 M/UL (ref 4–5.2)
SODIUM SERPL-SCNC: 141 MMOL/L (ref 136–145)
WBC # BLD AUTO: 6.3 K/UL (ref 4–11)

## 2024-09-07 PROCEDURE — 83735 ASSAY OF MAGNESIUM: CPT

## 2024-09-07 PROCEDURE — 99238 HOSP IP/OBS DSCHRG MGMT 30/<: CPT | Performed by: INTERNAL MEDICINE

## 2024-09-07 PROCEDURE — 6370000000 HC RX 637 (ALT 250 FOR IP): Performed by: INTERNAL MEDICINE

## 2024-09-07 PROCEDURE — 85027 COMPLETE CBC AUTOMATED: CPT

## 2024-09-07 PROCEDURE — 80048 BASIC METABOLIC PNL TOTAL CA: CPT

## 2024-09-07 PROCEDURE — 6370000000 HC RX 637 (ALT 250 FOR IP)

## 2024-09-07 PROCEDURE — 97530 THERAPEUTIC ACTIVITIES: CPT

## 2024-09-07 PROCEDURE — 36415 COLL VENOUS BLD VENIPUNCTURE: CPT

## 2024-09-07 PROCEDURE — G0378 HOSPITAL OBSERVATION PER HR: HCPCS

## 2024-09-07 PROCEDURE — 6360000002 HC RX W HCPCS: Performed by: INTERNAL MEDICINE

## 2024-09-07 PROCEDURE — 2580000003 HC RX 258

## 2024-09-07 PROCEDURE — 76770 US EXAM ABDO BACK WALL COMP: CPT

## 2024-09-07 RX ORDER — ATORVASTATIN CALCIUM 40 MG/1
40 TABLET, FILM COATED ORAL NIGHTLY
Qty: 30 TABLET | Refills: 1 | Status: SHIPPED | OUTPATIENT
Start: 2024-09-07

## 2024-09-07 RX ORDER — ACETAMINOPHEN 325 MG/1
650 TABLET ORAL EVERY 6 HOURS PRN
Status: DISCONTINUED | OUTPATIENT
Start: 2024-09-07 | End: 2024-09-07 | Stop reason: HOSPADM

## 2024-09-07 RX ORDER — NIFEDIPINE 30 MG/1
30 TABLET, EXTENDED RELEASE ORAL DAILY
Qty: 30 TABLET | Refills: 1 | Status: SHIPPED | OUTPATIENT
Start: 2024-09-08

## 2024-09-07 RX ORDER — POTASSIUM CHLORIDE 1500 MG/1
20 TABLET, EXTENDED RELEASE ORAL ONCE
Status: COMPLETED | OUTPATIENT
Start: 2024-09-07 | End: 2024-09-07

## 2024-09-07 RX ORDER — LISINOPRIL 10 MG/1
10 TABLET ORAL DAILY
Qty: 30 TABLET | Refills: 1 | Status: SHIPPED | OUTPATIENT
Start: 2024-09-08

## 2024-09-07 RX ADMIN — PANTOPRAZOLE SODIUM 40 MG: 40 TABLET, DELAYED RELEASE ORAL at 05:41

## 2024-09-07 RX ADMIN — SOTALOL HYDROCHLORIDE 80 MG: 80 TABLET ORAL at 09:08

## 2024-09-07 RX ADMIN — APIXABAN 5 MG: 5 TABLET, FILM COATED ORAL at 09:08

## 2024-09-07 RX ADMIN — LEVOTHYROXINE SODIUM 50 MCG: 50 TABLET ORAL at 05:41

## 2024-09-07 RX ADMIN — SERTRALINE 50 MG: 50 TABLET, FILM COATED ORAL at 09:08

## 2024-09-07 RX ADMIN — POTASSIUM CHLORIDE 20 MEQ: 1500 TABLET, EXTENDED RELEASE ORAL at 13:04

## 2024-09-07 RX ADMIN — ASPIRIN 81 MG: 81 TABLET, CHEWABLE ORAL at 09:08

## 2024-09-07 RX ADMIN — Medication 10 ML: at 09:10

## 2024-09-07 RX ADMIN — URSODIOL 250 MG: 250 TABLET, FILM COATED ORAL at 09:10

## 2024-09-07 RX ADMIN — LISINOPRIL 5 MG: 5 TABLET ORAL at 09:09

## 2024-09-07 RX ADMIN — NIFEDIPINE 30 MG: 30 TABLET, FILM COATED, EXTENDED RELEASE ORAL at 09:09

## 2024-09-07 ASSESSMENT — PAIN SCALES - GENERAL
PAINLEVEL_OUTOF10: 0

## 2024-09-07 NOTE — PROGRESS NOTES
Patients IV's have been removed with no complications. Patients telemetry monitor has been removed. Patient dressed and stated she has all of her belongings and is waiting on family to arrive for DC. Patient denies any needs at this time.

## 2024-09-07 NOTE — CARE COORDINATION
Case Management Assessment            Discharge Note                    Date / Time of Note: 9/7/2024 4:07 PM                  Discharge Note Completed by: Thea Huertas RN    Patient Name: Mague Tomlin   YOB: 1940  Diagnosis: Hypokalemia [E87.6]  Hypomagnesemia [E83.42]  Dizziness [R42]  Cerebrovascular accident (CVA), unspecified mechanism (HCC) [I63.9]  Hypertension, unspecified type [I10]   Date / Time: 9/5/2024  8:13 AM    Current PCP: Ramsey Weinberg Jr., MD    Advance Directives:  Code Status: Full Code    Financial:  Payor: [employer] MEDICARE / Plan: [employer] PREFERRED MEDICARE HMO / Product Type: *No Product type* /      Pharmacy:    [id number] - [place], [place] - [address] [phone] - F [phone]  [address] [address]  Phone: [phone] Fax: [phone]    [id number] [place] [place] [facility] [address] [phone] - F [phone]  [address] [place] [address]  Phone: [phone] Fax: [phone]    ADLS:  Current PT AM-PAC Score: 18 /24  Current OT AM-PAC Score: 21 /24    DISCHARGE Disposition: Home- No Services Needed    IMM Completed:   Yes, Case management has presented and reviewed [facility] letter #2 to the patient and/or family/ POA. Patient and/or family/POA verbalized understanding of their medicare rights and appeal process if needed. Patient and/or family/POA verbalized understanding of DC within 4 hours of signing IMM letter #2. Patient and/or family/POA has signed and placed today's date (9/7/2024) and time (1600) on IMM letter #2 on the the appropriate lines. Patient and/or family/POA, provided copy of letter and they are aware that original copy of IMM letter #2 is available prior to discharge from the paper chart on the unit.  Electronic documentation has been entered into epic for IMM letter #2 and original paper copy has been added to the paper chart at the nurses station.    Ms. Nabor is in agreement with discharge  within the 4 hour window of presentation of the 2nd IMM    IMM Letter given to Patient/Family/Significant other/Guardian/POA/by:: Thea Huertas RN  2ND IMM  IMM Letter date given:: 09/07/24  IMM Letter time given:: 1600    Transportation:  Transportation PLAN for discharge: family   Mode of Transport: Private Car    Home Care:  Declined    Additional CM Notes: Met with Ms. Tomlin at the bedside to discuss plans for discharge. She stated she will be returning home. No Home Care needs or DME needed.    COVID Result:    Lab Results   Component Value Date/Time    COVID19 NOT DETECTED 12/17/2021 12:44 AM       The Plan for Transition of Care is related to the following treatment goals of Hypokalemia [E87.6]  Hypomagnesemia [E83.42]  Dizziness [R42]  Cerebrovascular accident (CVA), unspecified mechanism (HCC) [I63.9]  Hypertension, unspecified type [I10]    The Patient and/or patient representative Mague and her family were provided with a choice of provider and agrees with the discharge plan Yes    Freedom of choice list was provided with basic dialogue that supports the patient's individualized plan of care/goals and shares the quality data associated with the providers. Yes    Care Transitions patient: No    Thea Huertas RN  [facility]  Case Management Department  [phone]

## 2024-09-07 NOTE — DISCHARGE SUMMARY
Hospital Medicine Discharge Summary    Patient: Mague Tomlin     Gender: female  : 1940   Age: 83 y.o.  MRN: [id number]    Admitting Physician: Elizabeth Quach DO  Discharge Physician: Elizabeth Quach DO    Code Status: Full Code     Admit Date: 2024   Discharge Date: 2024      Discharge Diagnoses:    Active Hospital Problems    Diagnosis Date Noted    Benign paroxysmal positional vertigo of left ear [H81.12] 2024    Dizziness [R42] 2024    Hypertensive urgency [I16.0] 2024    Hypokalemia [E87.6] 2024    Hypomagnesemia [E83.42] 2024    Hypothyroidism [E03.9] 2024    GERD (gastroesophageal reflux disease) [K21.9] 2024    Hypertension [I10]     PAF (paroxysmal atrial fibrillation) (HCC) [I48.0] 2021       Condition at Discharge: Stable    Hospital Course:     The patient is a 83 y.o. female with PMH of PAF s/p ablation, hypothyroidism, medication induced hepatic cirrhosis, renal infarct, tularemia, GERD, anxiety, and depression who presented to [facility] ED with complaint of dizziness. Pt states that she had an episode of slurred speech and dizziness on . She states that she has had intermittent dizziness, slurred speech, and has felt off balance since then. She states she lost her son to cancer around the same time of her first episode and was told that her sx were due to this but a friend recommended she come to the ER to be evaluated. Pt states she had a headache yesterday as well. She states that she takes Eliquis daily for afib (but told the ER she is not currently on AC per the ED note). She denies any extremity weakness, sensory deficits, vision changes, CP, SOB, fever, chills, abd pain, or recent fall.     Dizziness  Intermittent slurred speech?  -  stroke team not contacted in ED as pt's sx started one week ago  - CT head and CTA head and neck showed no acute abnormality   - MRI brain ordered - negative for acute stroke  - continue home  NECK: AORTIC ARCH/ARCH VESSELS: No dissection or arterial injury.  No significant stenosis of the brachiocephalic or subclavian arteries. CAROTID ARTERIES: No dissection, arterial injury, or hemodynamically significant stenosis by NASCET criteria. VERTEBRAL ARTERIES: No dissection, arterial injury, or significant stenosis. SOFT TISSUES: The lung apices are clear.  No cervical or superior mediastinal lymphadenopathy.  The larynx and pharynx are unremarkable.  No acute abnormality of the salivary and thyroid glands. BONES: No acute osseous abnormality. CTA HEAD: ANTERIOR CIRCULATION: No significant stenosis of the intracranial internal carotid, anterior cerebral, or middle cerebral arteries. No aneurysm. POSTERIOR CIRCULATION: No significant stenosis of the vertebral, basilar, or posterior cerebral arteries. No aneurysm. OTHER: No dural venous sinus thrombosis on this non-dedicated study. BRAIN: No mass effect or midline shift. No extra-axial fluid collection. The gray-white differentiation is maintained.     No significant stenosis or evidence for large vessel occlusion.     XR CHEST (2 VW)    Result Date: 9/5/2024  EXAMINATION: TWO XRAY VIEWS OF THE CHEST 9/5/2024 9:17 am COMPARISON: Chest radiograph performed 11/24/2020. HISTORY: ORDERING SYSTEM PROVIDED HISTORY: Pain TECHNOLOGIST PROVIDED HISTORY: Reason for exam:->Pain Reason for Exam: Dizziness (On 21st pt felt dizzy and lightheaded, then had trouble speaking. Has remained dizzy and lightheaded at times.  Periodically will have trouble speaking. ) FINDINGS: There is no acute consolidation or effusion.  There is no pneumothorax.  The mediastinal structures are unremarkable.  The upper abdomen is unremarkable. The extrathoracic soft tissues are unremarkable.  There is no acute osseous abnormality.     No acute cardiopulmonary process.     CT HEAD WO CONTRAST    Result Date: 9/5/2024  EXAMINATION: CT OF THE HEAD WITHOUT CONTRAST  9/5/2024 9:03 am TECHNIQUE: CT of

## 2024-09-07 NOTE — PLAN OF CARE
Problem: Discharge Planning  Goal: Discharge to home or other facility with appropriate resources  Outcome: Progressing     Problem: Safety - Adult  Goal: Free from fall injury  9/6/2024 2009 by Millicent Szymanski, RN  Outcome: Progressing  9/6/2024 1428 by Effie More, RN  Outcome: Progressing     Problem: Skin/Tissue Integrity  Goal: Absence of new skin breakdown  Description: 1.  Monitor for areas of redness and/or skin breakdown  2.  Assess vascular access sites hourly  3.  Every 4-6 hours minimum:  Change oxygen saturation probe site  4.  Every 4-6 hours:  If on nasal continuous positive airway pressure, respiratory therapy assess nares and determine need for appliance change or resting period.  Outcome: Progressing     Problem: Cardiovascular - Adult  Goal: Maintains optimal cardiac output and hemodynamic stability  Outcome: Progressing  Goal: Absence of cardiac dysrhythmias or at baseline  Outcome: Progressing

## 2024-09-07 NOTE — PROGRESS NOTES
Shift assessment complete see flow sheet respirations easy and even. Patient denies any pain or needs at this time. Bed is locked in the lowest position with call light within reach.     plan of care explained/side rails up/wait time explained plan of care explained/side rails up/wait time explained

## 2024-09-07 NOTE — FLOWSHEET NOTE
09/06/24 1945   Vital Signs   Temp 97.7 °F (36.5 °C)   Temp Source Oral   Pulse 72   Heart Rate Source Monitor   BP (!) 140/75   MAP (Calculated) 97   BP Location Right upper arm   BP Method Automatic   Patient Position Semi fowlers   Pain Assessment   Pain Assessment None - Denies Pain   Oxygen Therapy   SpO2 93 %   O2 Device None (Room air)

## 2024-09-07 NOTE — DISCHARGE INSTRUCTIONS
Check your blood pressures at home twice daily every day after at last 5 minutes at rest with feet on the ground and back supported. Bring your cuff to the doctors office to compare. Call your primary care or cardiologist tomorrow to make an appointment for hospital follow-up.     Your blood pressures have been mostly in the 140s to 150s since starting your new medications.     If you have any dizziness or light-headedness, check your blood pressures. If you are having pressures in the low 100s stop your nifedipine. If blood pressures remain low, stop lisinopril.    If your blood pressures are 120s to 150s/160s then continue the medications without change until you see your primary care doctor.   ____________________________________________________         Dizziness: Care Instructions  Overview  Dizziness is the feeling of unsteadiness or fuzziness in your head. It is different than having vertigo, which is a feeling that the room is spinning or that you are moving or falling. It is also different from lightheadedness, which is the feeling that you are about to faint.  It can be hard to know what causes dizziness. Some people feel dizzy when they have migraine headaches. Sometimes bouts of flu can make you feel dizzy. Some medical conditions, such as heart problems or high blood pressure, can make you feel dizzy. Many medicines can cause dizziness, including medicines for high blood pressure, pain, or anxiety.  If a medicine causes your symptoms, your doctor may recommend that you stop or change the medicine. If it is a problem with your heart, you may need medicine to help your heart work better. If there is no clear reason for your symptoms, your doctor may suggest watching and waiting for a while to see if the dizziness goes away on its own.  Follow-up care is a key part of your treatment and safety. Be sure to make and go to all appointments, and call your doctor if you are having problems. It's also a good

## 2024-09-07 NOTE — PROGRESS NOTES
Inpatient Occupational Therapy Treatment    Unit: PCU  Date:  9/7/2024  Patient Name:    Mague Tomlin  Admitting diagnosis:  Hypokalemia [E87.6]  Hypomagnesemia [E83.42]  Dizziness [R42]  Cerebrovascular accident (CVA), unspecified mechanism (HCC) [I63.9]  Hypertension, unspecified type [I10]  Admit Date:  9/5/2024  Precautions/Restrictions/WB Status/ Lines/ Wounds/ Oxygen: Fall risk, Bed/chair alarm, and Telemetry    Treatment Time:  11:00-11:15  Treatment Number:  2  Timed Code Treatment Minutes: 15 minutes  Total Treatment Minutes: 15 minutes    Patient Goals for Therapy: \"to go home\"          Discharge Recommendations: Home with PRN assist  DME needs for discharge: Needs Met       Therapy recommendations for staff:   Stand by assist for ambulation with use of Rollator and gait belt to/from chair  to/from bathroom  within room  within halls    History of Present Illness:  Per Dr. Lipscomb ED notes 9/5/24:  \"83 y.o. female who presents for evaluation of lightheadedness, slurred speech.  Patient states that she initially had an episode of slurred speech on August 21.  She states since that time, she felt like her speech has not been the same and still slightly slurred.  She states that she has been dizzy, lightheaded and feeling off balance.  She states that she recently went through the loss of her son to cancer and this has been really tough on her, the symptoms started about 5 minutes before he had passed away.  She states yesterday, she had a headache.  No vision changes.  She states that she was urged by a friend to be evaluated no recent falls or head injuries.  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 however is not currently on anticoagulation\"  CT head shows no acute intracranial abnormality  MRI and neurology consult pending    AM-PAC Score: AM-PAC Inpatient Daily Activity Raw Score: 21     Subjective:  Patient sitting up in chair with no family present.   Pt

## 2024-09-07 NOTE — DISCHARGE INSTR - COC
Continuity of Care Form    Patient Name: Mague Tomlin   :  1940  MRN:  [id number]    Admit date:  2024  Discharge date:  ***    Code Status Order: Full Code   Advance Directives:   Advance Care Flowsheet Documentation             Admitting Physician:  Elizabeth Quach DO  PCP: Ramsey Weinberg Jr., MD    Discharging Nurse: ***  Discharging Hospital Unit/Room#: /0324-01  Discharging Unit Phone Number: ***    Emergency Contact:   Extended Emergency Contact Information  Primary Emergency Contact: Ryan Billings  Home Phone: [phone]  Work Phone: [phone]  Mobile Phone: [phone]  Relation: Child  Secondary Emergency Contact: NallelydicksonHolly   [facility]  Home Phone: [phone]  Relation: Child    Past Surgical History:  Past Surgical History:   Procedure Laterality Date    ABLATION OF DYSRHYTHMIC FOCUS      CHOLECYSTECTOMY      COLONOSCOPY N/A 3/26/2021    COLONOSCOPY POLYPECTOMY ABLATION performed by Damon Cornejo MD at [facility] ENDOSCOPY    HYSTERECTOMY (CERVIX STATUS UNKNOWN)      JOINT REPLACEMENT      right knee    UPPER GASTROINTESTINAL ENDOSCOPY N/A 3/26/2021    EGD BIOPSY performed by Damon Cornejo MD at [facility] ENDOSCOPY       Immunization History:   Immunization History   Administered Date(s) Administered    COVID-19, J&J, (age 18y+), IM, 0.5 mL 2021       Active Problems:  Patient Active Problem List   Diagnosis Code    Abdominal pain R10.9    Renal infarct (HCC) N28.0    Hypoxia R09.02    PAF (paroxysmal atrial fibrillation) (HCC) I48.0    Leukocytosis D72.829    Hypertension I10    Kidney infarction (HCC) N28.0    Dizziness R42    Hypertensive urgency I16.0    Hypokalemia E87.6    Hypomagnesemia E83.42    Hypothyroidism E03.9    GERD (gastroesophageal reflux disease) K21.9    Benign paroxysmal positional vertigo of left ear H81.12       Isolation/Infection:   Isolation            No Isolation          Patient Infection Status       Infection Onset Added Last Indicated Last

## 2024-09-07 NOTE — PROGRESS NOTES
Progress Note    HISTORY     CC:   Dizziness              We are following for hypertension and low potassium       Subjective/   HPI:  She is doing well.  No pain in her kidneys.  BP range improving.  K stable and kidney function normal.     ROS:  Constitutional:  No fevers, No Chills, + weakness  Cardiovascular:  No palpations, no edema  Respiratory:  No wheezing, no cough  Skin:  No rash, no itching  :  No hematuria, no dysuria     Social Hx:  No Family at the bedside     Past Medical and Surgical History:  - Reviewed, no changes     EXAM       Objective/     Vitals:    09/06/24 2012 09/06/24 2355 09/07/24 0530 09/07/24 0731   BP:  127/75 (!) 145/77 (!) 186/76   Pulse:  73 63 69   Resp: 18 18 18 18   Temp:  98.2 °F (36.8 °C) 98.1 °F (36.7 °C) 98.4 °F (36.9 °C)   TempSrc:  Oral Oral Oral   SpO2:  95% 95% 100%   Weight:       Height:         24HR INTAKE/OUTPUT:    Intake/Output Summary (Last 24 hours) at 9/7/2024 1208  Last data filed at 9/7/2024 0833  Gross per 24 hour   Intake 360 ml   Output --   Net 360 ml     Constitutional:  Alert, awake, no apparent distress  Eyes:  Pupils reactive, sclera clear   Neck:  Normal thyroid, no masses   Cardiovascular:  Regular, no rub  Respiratory:  No distress, no wheezing  Psychiatry:  Appropriate mood/affect, alert  Abdomen: +bs, soft, nt, no masses   Musculoskeletal: No LE edema, no clubbing   Lymphatics:  No LAD in neck, no supraclavicular nodes       MEDICAL DECISION MAKING       Data/  Recent Labs     09/05/24  0845 09/06/24  0421 09/07/24  0514   WBC 7.1 9.1 6.3   HGB 13.5 13.6 13.4   HCT 39.0 39.4 38.5   MCV 86.0 86.4 87.2    176 174     Recent Labs     09/05/24  1245 09/05/24  1550 09/06/24  0421 09/07/24  0514   NA  --  138 139 141   K <1.5* 3.0* 4.1 3.5   CL  --  104 104 106   CO2  --  22 25 23   GLUCOSE  --  126* 112* 112*   MG 0.80* 2.90*  --  1.70*   BUN  --  11 10 12   CREATININE  --  0.8 0.7 0.8   LABGLOM  --  74 86 74       Assessment/     -  Hypertensive urgency   Range is substantially improved      - History of right renal infarct on prior CT imaging from 2021 - treated with course of eliquis at that time as outpatient   Renal u/s here is stable.  Mild size difference but no pain to suggest acute renal infarct although renal infarct would cause high renin with hypertension.  Now started on lisinopril and tolerating      - Hypokalemia/Hypomagnesemia - better with replacement, initial jimbo labs on admission felt to be erroneous      Plan/     20 mEq of KCl   I think lisinopril will be well targeted for her hypertension  Ok for discharge with further titration of medications as outpatient   Advised to have a liberal potassium diet       Thank you for asking us to participate in the management of your patient, please do not hesitate to contact me for any concerns regarding my recommendations as outlined above.    -----------------------------  Nadine Nicolas M.D.   [facility]

## 2024-09-09 ENCOUNTER — TELEPHONE (OUTPATIENT)
Dept: NEUROLOGY | Age: 84
End: 2024-09-09

## 2024-09-09 RX ORDER — LISINOPRIL 10 MG/1
10 TABLET ORAL DAILY
Qty: 90 TABLET | OUTPATIENT
Start: 2024-09-09

## 2024-09-09 RX ORDER — NIFEDIPINE 30 MG/1
30 TABLET, EXTENDED RELEASE ORAL DAILY
Qty: 90 TABLET | OUTPATIENT
Start: 2024-09-09

## 2024-09-09 RX ORDER — ATORVASTATIN CALCIUM 40 MG/1
TABLET, FILM COATED ORAL NIGHTLY
Qty: 90 TABLET | OUTPATIENT
Start: 2024-09-09

## 2024-10-07 ENCOUNTER — HOSPITAL ENCOUNTER (EMERGENCY)
Age: 84
Discharge: HOME | End: 2024-10-08
Attending: STUDENT IN AN ORGANIZED HEALTH CARE EDUCATION/TRAINING PROGRAM
Payer: MEDICARE

## 2024-10-07 ENCOUNTER — APPOINTMENT (OUTPATIENT)
Dept: CT IMAGING | Age: 84
End: 2024-10-07
Payer: MEDICARE

## 2024-10-07 ENCOUNTER — APPOINTMENT (OUTPATIENT)
Dept: GENERAL RADIOLOGY | Age: 84
End: 2024-10-07
Payer: MEDICARE

## 2024-10-07 VITALS
RESPIRATION RATE: 16 BRPM | BODY MASS INDEX: 28.93 KG/M2 | OXYGEN SATURATION: 95 % | TEMPERATURE: 98 F | SYSTOLIC BLOOD PRESSURE: 121 MMHG | WEIGHT: 180 LBS | HEIGHT: 66 IN | DIASTOLIC BLOOD PRESSURE: 85 MMHG | HEART RATE: 71 BPM

## 2024-10-07 DIAGNOSIS — R51.9 NONINTRACTABLE HEADACHE, UNSPECIFIED CHRONICITY PATTERN, UNSPECIFIED HEADACHE TYPE: Primary | ICD-10-CM

## 2024-10-07 DIAGNOSIS — I10 HYPERTENSION, UNSPECIFIED TYPE: ICD-10-CM

## 2024-10-07 DIAGNOSIS — R42 VERTIGO: ICD-10-CM
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
## 2024-10-07 LAB
ALBUMIN SERPL-MCNC: 3.9 G/DL (ref 3.4–5)
ALBUMIN/GLOB SERPL: 1.3 {RATIO} (ref 1.1–2.2)
ALP SERPL-CCNC: 158 U/L (ref 40–129)
ALT SERPL-CCNC: 11 U/L (ref 10–40)
ANION GAP SERPL CALCULATED.3IONS-SCNC: 12 MMOL/L (ref 3–16)
AST SERPL-CCNC: 16 U/L (ref 15–37)
BACTERIA URNS QL MICRO: ABNORMAL /HPF
BASOPHILS # BLD: 0.1 K/UL (ref 0–0.2)
BASOPHILS NFR BLD: 1 %
BILIRUB SERPL-MCNC: 0.4 MG/DL (ref 0–1)
BILIRUB UR QL STRIP.AUTO: ABNORMAL
BUN SERPL-MCNC: 22 MG/DL (ref 7–20)
CALCIUM SERPL-MCNC: 8.3 MG/DL (ref 8.3–10.6)
CHLORIDE SERPL-SCNC: 104 MMOL/L (ref 99–110)
CLARITY UR: ABNORMAL
CO2 SERPL-SCNC: 25 MMOL/L (ref 21–32)
COLOR UR: ABNORMAL
CREAT SERPL-MCNC: 1.1 MG/DL (ref 0.6–1.2)
DEPRECATED RDW RBC AUTO: 14.3 % (ref 12.4–15.4)
EOSINOPHIL # BLD: 0.1 K/UL (ref 0–0.6)
EOSINOPHIL NFR BLD: 1.3 %
EPI CELLS #/AREA URNS HPF: ABNORMAL /HPF (ref 0–5)
GFR SERPLBLD CREATININE-BSD FMLA CKD-EPI: 50 ML/MIN/{1.73_M2}
GLUCOSE SERPL-MCNC: 105 MG/DL (ref 70–99)
GLUCOSE UR STRIP.AUTO-MCNC: NEGATIVE MG/DL
HCT VFR BLD AUTO: 41.6 % (ref 36–48)
HGB BLD-MCNC: 14.4 G/DL (ref 12–16)
HGB UR QL STRIP.AUTO: NEGATIVE
HYALINE CASTS #/AREA URNS LPF: ABNORMAL /LPF (ref 0–2)
KETONES UR STRIP.AUTO-MCNC: ABNORMAL MG/DL
LEUKOCYTE ESTERASE UR QL STRIP.AUTO: ABNORMAL
LYMPHOCYTES # BLD: 2.4 K/UL (ref 1–5.1)
LYMPHOCYTES NFR BLD: 28.4 %
MCH RBC QN AUTO: 30.4 PG (ref 26–34)
MCHC RBC AUTO-ENTMCNC: 34.6 G/DL (ref 31–36)
MCV RBC AUTO: 87.8 FL (ref 80–100)
MONOCYTES # BLD: 0.6 K/UL (ref 0–1.3)
MONOCYTES NFR BLD: 7 %
MUCOUS THREADS #/AREA URNS LPF: ABNORMAL /LPF
NEUTROPHILS # BLD: 5.2 K/UL (ref 1.7–7.7)
NEUTROPHILS NFR BLD: 62.3 %
NITRITE UR QL STRIP.AUTO: POSITIVE
NT-PROBNP SERPL-MCNC: 3989 PG/ML (ref 0–449)
PH UR STRIP.AUTO: 6 [PH] (ref 5–8)
PLATELET # BLD AUTO: 186 K/UL (ref 135–450)
PMV BLD AUTO: 8 FL (ref 5–10.5)
POTASSIUM SERPL-SCNC: 3.6 MMOL/L (ref 3.5–5.1)
PROT SERPL-MCNC: 6.8 G/DL (ref 6.4–8.2)
PROT UR STRIP.AUTO-MCNC: ABNORMAL MG/DL
RBC # BLD AUTO: 4.74 M/UL (ref 4–5.2)
RBC #/AREA URNS HPF: ABNORMAL /HPF (ref 0–4)
RENAL EPI CELLS #/AREA UR COMP ASSIST: ABNORMAL /HPF (ref 0–1)
SODIUM SERPL-SCNC: 141 MMOL/L (ref 136–145)
SP GR UR STRIP.AUTO: 1.02 (ref 1–1.03)
TROPONIN, HIGH SENSITIVITY: 18 NG/L (ref 0–14)
TROPONIN, HIGH SENSITIVITY: 25 NG/L (ref 0–14)
UA COMPLETE W REFLEX CULTURE PNL UR: YES
UA DIPSTICK W REFLEX MICRO PNL UR: YES
URN SPEC COLLECT METH UR: ABNORMAL
UROBILINOGEN UR STRIP-ACNC: 1 E.U./DL
WBC # BLD AUTO: 8.4 K/UL (ref 4–11)
WBC #/AREA URNS HPF: ABNORMAL /HPF (ref 0–5)

## 2024-10-07 PROCEDURE — 36415 COLL VENOUS BLD VENIPUNCTURE: CPT

## 2024-10-07 PROCEDURE — 84484 ASSAY OF TROPONIN QUANT: CPT

## 2024-10-07 PROCEDURE — 70450 CT HEAD/BRAIN W/O DYE: CPT

## 2024-10-07 PROCEDURE — 85025 COMPLETE CBC W/AUTO DIFF WBC: CPT

## 2024-10-07 PROCEDURE — 87088 URINE BACTERIA CULTURE: CPT

## 2024-10-07 PROCEDURE — 99285 EMERGENCY DEPT VISIT HI MDM: CPT

## 2024-10-07 PROCEDURE — 81001 URINALYSIS AUTO W/SCOPE: CPT

## 2024-10-07 PROCEDURE — 93005 ELECTROCARDIOGRAM TRACING: CPT | Performed by: STUDENT IN AN ORGANIZED HEALTH CARE EDUCATION/TRAINING PROGRAM

## 2024-10-07 PROCEDURE — 80053 COMPREHEN METABOLIC PANEL: CPT

## 2024-10-07 PROCEDURE — 87086 URINE CULTURE/COLONY COUNT: CPT

## 2024-10-07 PROCEDURE — 83880 ASSAY OF NATRIURETIC PEPTIDE: CPT

## 2024-10-07 PROCEDURE — 72125 CT NECK SPINE W/O DYE: CPT

## 2024-10-07 PROCEDURE — 71045 X-RAY EXAM CHEST 1 VIEW: CPT

## 2024-10-07 PROCEDURE — 6370000000 HC RX 637 (ALT 250 FOR IP): Performed by: STUDENT IN AN ORGANIZED HEALTH CARE EDUCATION/TRAINING PROGRAM

## 2024-10-07 PROCEDURE — 87186 SC STD MICRODIL/AGAR DIL: CPT

## 2024-10-07 RX ORDER — CEPHALEXIN 500 MG/1
500 CAPSULE ORAL ONCE
Status: COMPLETED | OUTPATIENT
Start: 2024-10-07 | End: 2024-10-07

## 2024-10-07 RX ADMIN — CEPHALEXIN 500 MG: 500 CAPSULE ORAL at 22:59

## 2024-10-07 ASSESSMENT — LIFESTYLE VARIABLES: HOW OFTEN DO YOU HAVE A DRINK CONTAINING ALCOHOL: NEVER

## 2024-10-07 ASSESSMENT — PAIN SCALES - GENERAL
PAINLEVEL_OUTOF10: 8
PAINLEVEL_OUTOF10: 6

## 2024-10-07 ASSESSMENT — PAIN DESCRIPTION - LOCATION
LOCATION: HEAD
LOCATION: HEAD

## 2024-10-07 ASSESSMENT — PAIN DESCRIPTION - DESCRIPTORS
DESCRIPTORS: ACHING
DESCRIPTORS: ACHING

## 2024-10-07 ASSESSMENT — PAIN - FUNCTIONAL ASSESSMENT: PAIN_FUNCTIONAL_ASSESSMENT: 0-10

## 2024-10-08 LAB
EKG ATRIAL RATE: 66 BPM
EKG DIAGNOSIS: NORMAL
EKG P AXIS: 45 DEGREES
EKG P-R INTERVAL: 140 MS
EKG Q-T INTERVAL: 442 MS
EKG QRS DURATION: 66 MS
EKG QTC CALCULATION (BAZETT): 463 MS
EKG R AXIS: -12 DEGREES
EKG T AXIS: 26 DEGREES
EKG VENTRICULAR RATE: 66 BPM

## 2024-10-08 PROCEDURE — 93010 ELECTROCARDIOGRAM REPORT: CPT | Performed by: INTERNAL MEDICINE

## 2024-10-08 RX ORDER — MECLIZINE HYDROCHLORIDE 25 MG/1
25 TABLET ORAL 3 TIMES DAILY PRN
Qty: 30 TABLET | Refills: 0 | Status: SHIPPED | OUTPATIENT
Start: 2024-10-08 | End: 2024-10-18

## 2024-10-08 NOTE — ED PROVIDER NOTES
in ED Course.    Risk  Prescription drug management.  Decision regarding hospitalization.          The patient was given the followingmedications:  Orders Placed This Encounter   Medications    cephALEXin (KEFLEX) capsule 500 mg     Order Specific Question:   Antimicrobial Indications     Answer:   Urinary Tract Infection    meclizine (ANTIVERT) 25 MG tablet     Sig: Take 1 tablet by mouth 3 times daily as needed for Dizziness or Nausea     Dispense:  30 tablet     Refill:  0       CONSULTS:  None      CRITICAL CARE TIME   Total Critical Care time was 0 minutes, excluding separately reportable procedures in the context of the following condition na.  There was a high probability of clinically significant/life threatening deterioration in the patient's condition which required my urgent intervention.    Clinical Impression     1. Nonintractable headache, unspecified chronicity pattern, unspecified headache type    2. Fall, initial encounter    3. Hypertension, unspecified type    4. Vertigo        Disposition     PATIENT REFERRED TO:  Ramsey Weinberg Jr., MD  [address] [facility] [address]  [phone]    Schedule an appointment as soon as possible for a visit in 1 week        DISCHARGE MEDICATIONS:  Discharge Medication List as of 10/8/2024 12:50 AM        START taking these medications    Details   meclizine (ANTIVERT) 25 MG tablet Take 1 tablet by mouth 3 times daily as needed for Dizziness or Nausea, Disp-30 tablet, R-0Normal             DISPOSITION Decision To Discharge 10/08/2024 12:36:49 AM  Condition at Disposition: Data Unavailable      Jj Ocampo MD (electronically signed)  Attending Emergency Physician    Please note this documentation has been produced using speech recognition software and may contain errors related to that system including errors in grammar, punctuation, and spelling, as well as words and phrases that may be inappropriate.  Efforts were made to edit the dictations.

## 2024-10-08 NOTE — DISCHARGE INSTRUCTIONS
You were evaluated in the emergency department for headache, fall, high blood pressure and dizziness. Assessments and testing completed during your visit were reassuring and at this time there is no indication for further testing, treatment or admission to the hospital. Given this it is appropriate to discharge you from the emergency department. At the time of discharge we discussed the following:    Please try the medication for your dizziness and discuss further with your doctor     Please note that sometimes it is difficult to diagnose a medical condition early in the disease process before the disease is fully manifest. Because of this, should you develop any new or worsening symptoms, you may return at any time to the emergency department for another evaluation. If available you are also recommended to review this visit with your primary care physician or other medical provider in the next 7 days. Thank you for allowing us to care for you today.

## 2024-10-09 LAB
BACTERIA UR CULT: ABNORMAL
ORGANISM: ABNORMAL

## 2024-11-10 ENCOUNTER — APPOINTMENT (OUTPATIENT)
Dept: GENERAL RADIOLOGY | Age: 84
End: 2024-11-10
Payer: MEDICARE

## 2024-11-10 ENCOUNTER — APPOINTMENT (OUTPATIENT)
Dept: CT IMAGING | Age: 84
End: 2024-11-10
Payer: MEDICARE

## 2024-11-10 ENCOUNTER — HOSPITAL ENCOUNTER (EMERGENCY)
Age: 84
Discharge: HOME | End: 2024-11-10
Payer: MEDICARE

## 2024-11-10 VITALS
TEMPERATURE: 97.4 F | RESPIRATION RATE: 15 BRPM | DIASTOLIC BLOOD PRESSURE: 82 MMHG | OXYGEN SATURATION: 98 % | SYSTOLIC BLOOD PRESSURE: 201 MMHG | HEIGHT: 66 IN | HEART RATE: 63 BPM | WEIGHT: 220 LBS | BODY MASS INDEX: 35.36 KG/M2

## 2024-11-10 DIAGNOSIS — I10 UNCONTROLLED HYPERTENSION: Primary | ICD-10-CM

## 2024-11-10 LAB
ANION GAP SERPL CALCULATED.3IONS-SCNC: 9 MMOL/L (ref 3–16)
BASOPHILS # BLD: 0.1 K/UL (ref 0–0.2)
BASOPHILS NFR BLD: 1.2 %
BUN SERPL-MCNC: 17 MG/DL (ref 7–20)
CALCIUM SERPL-MCNC: 9 MG/DL (ref 8.3–10.6)
CHLORIDE SERPL-SCNC: 102 MMOL/L (ref 99–110)
CO2 SERPL-SCNC: 27 MMOL/L (ref 21–32)
CREAT SERPL-MCNC: 1.1 MG/DL (ref 0.6–1.2)
DEPRECATED RDW RBC AUTO: 14.9 % (ref 12.4–15.4)
EKG ATRIAL RATE: 70 BPM
EKG DIAGNOSIS: NORMAL
EKG P AXIS: 41 DEGREES
EKG P-R INTERVAL: 128 MS
EKG Q-T INTERVAL: 404 MS
EKG QRS DURATION: 70 MS
EKG QTC CALCULATION (BAZETT): 436 MS
EKG R AXIS: -6 DEGREES
EKG T AXIS: 40 DEGREES
EKG VENTRICULAR RATE: 70 BPM
EOSINOPHIL # BLD: 0.1 K/UL (ref 0–0.6)
EOSINOPHIL NFR BLD: 1.6 %
GFR SERPLBLD CREATININE-BSD FMLA CKD-EPI: 50 ML/MIN/{1.73_M2}
GLUCOSE SERPL-MCNC: 117 MG/DL (ref 70–99)
HCT VFR BLD AUTO: 39.9 % (ref 36–48)
HGB BLD-MCNC: 13.9 G/DL (ref 12–16)
LYMPHOCYTES # BLD: 1.8 K/UL (ref 1–5.1)
LYMPHOCYTES NFR BLD: 26.1 %
MCH RBC QN AUTO: 30.8 PG (ref 26–34)
MCHC RBC AUTO-ENTMCNC: 34.7 G/DL (ref 31–36)
MCV RBC AUTO: 88.8 FL (ref 80–100)
MONOCYTES # BLD: 0.7 K/UL (ref 0–1.3)
MONOCYTES NFR BLD: 9.5 %
NEUTROPHILS # BLD: 4.3 K/UL (ref 1.7–7.7)
NEUTROPHILS NFR BLD: 61.6 %
PLATELET # BLD AUTO: 206 K/UL (ref 135–450)
PMV BLD AUTO: 7.1 FL (ref 5–10.5)
POTASSIUM SERPL-SCNC: 3.9 MMOL/L (ref 3.5–5.1)
RBC # BLD AUTO: 4.5 M/UL (ref 4–5.2)
SODIUM SERPL-SCNC: 138 MMOL/L (ref 136–145)
TROPONIN, HIGH SENSITIVITY: <6 NG/L (ref 0–14)
WBC # BLD AUTO: 6.9 K/UL (ref 4–11)

## 2024-11-10 PROCEDURE — 99285 EMERGENCY DEPT VISIT HI MDM: CPT

## 2024-11-10 PROCEDURE — 84484 ASSAY OF TROPONIN QUANT: CPT

## 2024-11-10 PROCEDURE — 96375 TX/PRO/DX INJ NEW DRUG ADDON: CPT

## 2024-11-10 PROCEDURE — 96374 THER/PROPH/DIAG INJ IV PUSH: CPT

## 2024-11-10 PROCEDURE — 36415 COLL VENOUS BLD VENIPUNCTURE: CPT

## 2024-11-10 PROCEDURE — 93010 ELECTROCARDIOGRAM REPORT: CPT | Performed by: INTERNAL MEDICINE

## 2024-11-10 PROCEDURE — 71046 X-RAY EXAM CHEST 2 VIEWS: CPT

## 2024-11-10 PROCEDURE — 6370000000 HC RX 637 (ALT 250 FOR IP)

## 2024-11-10 PROCEDURE — 6360000002 HC RX W HCPCS: Performed by: STUDENT IN AN ORGANIZED HEALTH CARE EDUCATION/TRAINING PROGRAM

## 2024-11-10 PROCEDURE — 85025 COMPLETE CBC W/AUTO DIFF WBC: CPT

## 2024-11-10 PROCEDURE — 6360000002 HC RX W HCPCS

## 2024-11-10 PROCEDURE — 70450 CT HEAD/BRAIN W/O DYE: CPT

## 2024-11-10 PROCEDURE — 80048 BASIC METABOLIC PNL TOTAL CA: CPT

## 2024-11-10 PROCEDURE — 6370000000 HC RX 637 (ALT 250 FOR IP): Performed by: STUDENT IN AN ORGANIZED HEALTH CARE EDUCATION/TRAINING PROGRAM

## 2024-11-10 PROCEDURE — 93005 ELECTROCARDIOGRAM TRACING: CPT | Performed by: STUDENT IN AN ORGANIZED HEALTH CARE EDUCATION/TRAINING PROGRAM

## 2024-11-10 RX ORDER — LISINOPRIL 10 MG/1
10 TABLET ORAL ONCE
Status: COMPLETED | OUTPATIENT
Start: 2024-11-10 | End: 2024-11-10

## 2024-11-10 RX ORDER — ACETAMINOPHEN 500 MG
1000 TABLET ORAL
Status: COMPLETED | OUTPATIENT
Start: 2024-11-10 | End: 2024-11-10

## 2024-11-10 RX ORDER — ONDANSETRON 2 MG/ML
4 INJECTION INTRAMUSCULAR; INTRAVENOUS ONCE
Status: COMPLETED | OUTPATIENT
Start: 2024-11-10 | End: 2024-11-10

## 2024-11-10 RX ORDER — HYDRALAZINE HYDROCHLORIDE 20 MG/ML
5 INJECTION INTRAMUSCULAR; INTRAVENOUS ONCE
Status: COMPLETED | OUTPATIENT
Start: 2024-11-10 | End: 2024-11-10

## 2024-11-10 RX ORDER — LISINOPRIL 10 MG/1
20 TABLET ORAL DAILY
Qty: 30 TABLET | Refills: 1 | Status: SHIPPED | OUTPATIENT
Start: 2024-11-10

## 2024-11-10 RX ADMIN — ACETAMINOPHEN 1000 MG: 500 TABLET ORAL at 20:31

## 2024-11-10 RX ADMIN — LISINOPRIL 10 MG: 10 TABLET ORAL at 16:41

## 2024-11-10 RX ADMIN — HYDRALAZINE HYDROCHLORIDE 5 MG: 20 INJECTION INTRAMUSCULAR; INTRAVENOUS at 18:27

## 2024-11-10 RX ADMIN — ONDANSETRON 4 MG: 2 INJECTION INTRAMUSCULAR; INTRAVENOUS at 20:32

## 2024-11-10 ASSESSMENT — PAIN DESCRIPTION - PAIN TYPE: TYPE: ACUTE PAIN

## 2024-11-10 ASSESSMENT — PAIN - FUNCTIONAL ASSESSMENT: PAIN_FUNCTIONAL_ASSESSMENT: 0-10

## 2024-11-10 ASSESSMENT — PAIN SCALES - GENERAL: PAINLEVEL_OUTOF10: 8

## 2024-11-10 ASSESSMENT — PAIN DESCRIPTION - LOCATION: LOCATION: CHEST;BACK

## 2024-11-10 NOTE — ED TRIAGE NOTES
Patient presents to the ED from home with c/o HTN, headache x 1 month. Patient reports /130 last night. No other complaints. Denies blurry vision, weakness. Reports chest pain that radiates to her back.

## 2024-11-10 NOTE — DISCHARGE INSTRUCTIONS
There is no evidence of endorgan damage on your blood work concerning for hypertensive urgency or hypertensive emergency.  I am adjusting your lisinopril.  I am increasing your dose from 10 mg to 20 mg daily.  Continue taking all of your other historically medications.  I will specifically be important to take your valsartan which is the other blood pressure medication that you were started on my [facility].  Call your primary care doctor tomorrow for a formal blood pressure check.     Please return if worse.

## 2024-11-11 ASSESSMENT — ENCOUNTER SYMPTOMS
CHEST TIGHTNESS: 0
VOMITING: 0
NAUSEA: 0
SINUS PAIN: 0
COUGH: 0
SORE THROAT: 0
ABDOMINAL PAIN: 0
RHINORRHEA: 0
TROUBLE SWALLOWING: 0
SHORTNESS OF BREATH: 0
SINUS PRESSURE: 0
WHEEZING: 0

## 2024-11-11 NOTE — ED NOTES
Discharge instructions gone over, patient denies any further questions at this time. Patient wheeled to car by writer, left with family

## 2024-11-11 NOTE — ED PROVIDER NOTES
ECG    The Ekg interpreted by me shows sinus rhythm with a rate of 70 bpm.  Normal axis.  No acute injury pattern.  , QRS 70, QTc 436.    No significant change from prior EKG dated 10/7/2024     Sy Murdock DO  11/11/24 0232    
TO:  Ramsey Weinberg Jr., MD  [address]  [facility] [address]  [phone]    Call in 1 day  Emergency department follow-up, blood pressure check    [facility] ED  [address] [place] [id number] [phone]  Go to   If symptoms worsen      DISCHARGE MEDICATIONS:  Discharge Medication List as of 11/10/2024  8:28 PM          DISCONTINUED MEDICATIONS:  Discharge Medication List as of 11/10/2024  8:28 PM                 (Please note that portions of this note were completed with a voice recognition program.  Efforts were made to edit the dictations but occasionally words are mis-transcribed.)    JEANNIE Eckert CNP (electronically signed)      Rachel Nicholson APRN - CNP  11/11/24 6444

## 2025-04-02 ENCOUNTER — HOSPITAL ENCOUNTER (EMERGENCY)
Age: 85
Discharge: HOME | End: 2025-04-03
Attending: STUDENT IN AN ORGANIZED HEALTH CARE EDUCATION/TRAINING PROGRAM
Payer: MEDICARE

## 2025-04-02 ENCOUNTER — APPOINTMENT (OUTPATIENT)
Dept: CT IMAGING | Age: 85
End: 2025-04-02
Payer: MEDICARE

## 2025-04-02 ENCOUNTER — APPOINTMENT (OUTPATIENT)
Dept: GENERAL RADIOLOGY | Age: 85
End: 2025-04-02
Payer: MEDICARE

## 2025-04-02 DIAGNOSIS — H81.10 BENIGN PAROXYSMAL POSITIONAL VERTIGO, UNSPECIFIED LATERALITY: Primary | ICD-10-CM

## 2025-04-02 LAB
ALBUMIN SERPL-MCNC: 3.8 G/DL (ref 3.4–5)
ALBUMIN/GLOB SERPL: 1.1 {RATIO} (ref 1.1–2.2)
ALP SERPL-CCNC: 133 U/L (ref 40–129)
ALT SERPL-CCNC: 20 U/L (ref 10–40)
ANION GAP SERPL CALCULATED.3IONS-SCNC: 13 MMOL/L (ref 3–16)
AST SERPL-CCNC: 20 U/L (ref 15–37)
BASE EXCESS BLDV CALC-SCNC: -3.7 MMOL/L (ref -3–3)
BASOPHILS # BLD: 0 K/UL (ref 0–0.2)
BASOPHILS NFR BLD: 0.7 %
BILIRUB SERPL-MCNC: <0.2 MG/DL (ref 0–1)
BUN SERPL-MCNC: 37 MG/DL (ref 7–20)
CALCIUM SERPL-MCNC: 9.8 MG/DL (ref 8.3–10.6)
CHLORIDE SERPL-SCNC: 106 MMOL/L (ref 99–110)
CO2 BLDV-SCNC: 22 MMOL/L
CO2 SERPL-SCNC: 21 MMOL/L (ref 21–32)
COHGB MFR BLDV: 0.8 % (ref 0–1.5)
CREAT SERPL-MCNC: 1.2 MG/DL (ref 0.6–1.2)
DEPRECATED RDW RBC AUTO: 13.4 % (ref 12.4–15.4)
EOSINOPHIL # BLD: 0.2 K/UL (ref 0–0.6)
EOSINOPHIL NFR BLD: 2.2 %
FLUAV RNA RESP QL NAA+PROBE: NOT DETECTED
FLUBV RNA RESP QL NAA+PROBE: NOT DETECTED
GFR SERPLBLD CREATININE-BSD FMLA CKD-EPI: 45 ML/MIN/{1.73_M2}
GLUCOSE SERPL-MCNC: 99 MG/DL (ref 70–99)
HCO3 BLDV-SCNC: 21.3 MMOL/L (ref 23–29)
HCT VFR BLD AUTO: 37.1 % (ref 36–48)
HGB BLD-MCNC: 12.8 G/DL (ref 12–16)
INR PPP: 1.22 (ref 0.85–1.15)
LACTATE BLDV-SCNC: 1 MMOL/L (ref 0.4–2)
LYMPHOCYTES # BLD: 1.9 K/UL (ref 1–5.1)
LYMPHOCYTES NFR BLD: 28.4 %
MCH RBC QN AUTO: 30.6 PG (ref 26–34)
MCHC RBC AUTO-ENTMCNC: 34.4 G/DL (ref 31–36)
MCV RBC AUTO: 88.9 FL (ref 80–100)
METHGB MFR BLDV: 0.3 %
MONOCYTES # BLD: 0.6 K/UL (ref 0–1.3)
MONOCYTES NFR BLD: 8.9 %
NEUTROPHILS # BLD: 4.1 K/UL (ref 1.7–7.7)
NEUTROPHILS NFR BLD: 59.8 %
NT-PROBNP SERPL-MCNC: 824 PG/ML (ref 0–449)
O2 CT VFR BLDV CALC: 16 VOL %
O2 THERAPY: ABNORMAL
PCO2 BLDV: 38.4 MMHG (ref 40–50)
PH BLDV: 7.36 [PH] (ref 7.35–7.45)
PLATELET # BLD AUTO: 201 K/UL (ref 135–450)
PMV BLD AUTO: 7.4 FL (ref 5–10.5)
PO2 BLDV: 50 MMHG (ref 25–40)
POTASSIUM SERPL-SCNC: 3.9 MMOL/L (ref 3.5–5.1)
PROT SERPL-MCNC: 7.2 G/DL (ref 6.4–8.2)
PROTHROMBIN TIME: 15.6 SEC (ref 11.9–14.9)
RBC # BLD AUTO: 4.17 M/UL (ref 4–5.2)
SAO2 % BLDV: 84 %
SARS-COV-2 RNA RESP QL NAA+PROBE: NOT DETECTED
SODIUM SERPL-SCNC: 140 MMOL/L (ref 136–145)
TROPONIN, HIGH SENSITIVITY: 19 NG/L (ref 0–14)
TROPONIN, HIGH SENSITIVITY: 21 NG/L (ref 0–14)
TSH SERPL DL<=0.005 MIU/L-ACNC: 2.72 UIU/ML (ref 0.27–4.2)
WBC # BLD AUTO: 6.9 K/UL (ref 4–11)

## 2025-04-02 PROCEDURE — 6370000000 HC RX 637 (ALT 250 FOR IP): Performed by: STUDENT IN AN ORGANIZED HEALTH CARE EDUCATION/TRAINING PROGRAM

## 2025-04-02 PROCEDURE — 70450 CT HEAD/BRAIN W/O DYE: CPT

## 2025-04-02 PROCEDURE — 99285 EMERGENCY DEPT VISIT HI MDM: CPT

## 2025-04-02 PROCEDURE — 2580000003 HC RX 258: Performed by: STUDENT IN AN ORGANIZED HEALTH CARE EDUCATION/TRAINING PROGRAM

## 2025-04-02 PROCEDURE — 84484 ASSAY OF TROPONIN QUANT: CPT

## 2025-04-02 PROCEDURE — 87636 SARSCOV2 & INF A&B AMP PRB: CPT

## 2025-04-02 PROCEDURE — 6360000004 HC RX CONTRAST MEDICATION: Performed by: STUDENT IN AN ORGANIZED HEALTH CARE EDUCATION/TRAINING PROGRAM

## 2025-04-02 PROCEDURE — 71045 X-RAY EXAM CHEST 1 VIEW: CPT

## 2025-04-02 PROCEDURE — 70498 CT ANGIOGRAPHY NECK: CPT

## 2025-04-02 PROCEDURE — 84443 ASSAY THYROID STIM HORMONE: CPT

## 2025-04-02 PROCEDURE — 6360000002 HC RX W HCPCS: Performed by: STUDENT IN AN ORGANIZED HEALTH CARE EDUCATION/TRAINING PROGRAM

## 2025-04-02 PROCEDURE — 85025 COMPLETE CBC W/AUTO DIFF WBC: CPT

## 2025-04-02 PROCEDURE — 82803 BLOOD GASES ANY COMBINATION: CPT

## 2025-04-02 PROCEDURE — 83880 ASSAY OF NATRIURETIC PEPTIDE: CPT

## 2025-04-02 PROCEDURE — 93005 ELECTROCARDIOGRAM TRACING: CPT | Performed by: STUDENT IN AN ORGANIZED HEALTH CARE EDUCATION/TRAINING PROGRAM

## 2025-04-02 PROCEDURE — 96374 THER/PROPH/DIAG INJ IV PUSH: CPT

## 2025-04-02 PROCEDURE — 85610 PROTHROMBIN TIME: CPT

## 2025-04-02 PROCEDURE — 80053 COMPREHEN METABOLIC PANEL: CPT

## 2025-04-02 PROCEDURE — 83605 ASSAY OF LACTIC ACID: CPT

## 2025-04-02 RX ORDER — AMLODIPINE BESYLATE 2.5 MG/1
2.5 TABLET ORAL DAILY
COMMUNITY

## 2025-04-02 RX ORDER — ONDANSETRON 2 MG/ML
4 INJECTION INTRAMUSCULAR; INTRAVENOUS ONCE
Status: COMPLETED | OUTPATIENT
Start: 2025-04-02 | End: 2025-04-02

## 2025-04-02 RX ORDER — OMEPRAZOLE 40 MG/1
40 CAPSULE, DELAYED RELEASE ORAL DAILY
COMMUNITY
Start: 2025-02-07

## 2025-04-02 RX ORDER — MECLIZINE HCL 25MG 25 MG/1
50 TABLET, CHEWABLE ORAL ONCE
Status: COMPLETED | OUTPATIENT
Start: 2025-04-02 | End: 2025-04-02

## 2025-04-02 RX ORDER — ZOLPIDEM TARTRATE 10 MG/1
10 TABLET ORAL NIGHTLY PRN
COMMUNITY
Start: 2025-03-31 | End: 2025-04-30

## 2025-04-02 RX ORDER — MECLIZINE HYDROCHLORIDE 25 MG/1
25 TABLET ORAL 3 TIMES DAILY PRN
COMMUNITY
End: 2025-04-03

## 2025-04-02 RX ORDER — IOPAMIDOL 755 MG/ML
75 INJECTION, SOLUTION INTRAVASCULAR
Status: COMPLETED | OUTPATIENT
Start: 2025-04-02 | End: 2025-04-02

## 2025-04-02 RX ORDER — ESCITALOPRAM OXALATE 20 MG/1
20 TABLET ORAL DAILY
COMMUNITY

## 2025-04-02 RX ORDER — SODIUM CHLORIDE, SODIUM LACTATE, POTASSIUM CHLORIDE, AND CALCIUM CHLORIDE .6; .31; .03; .02 G/100ML; G/100ML; G/100ML; G/100ML
1000 INJECTION, SOLUTION INTRAVENOUS ONCE
Status: COMPLETED | OUTPATIENT
Start: 2025-04-02 | End: 2025-04-03

## 2025-04-02 RX ADMIN — SODIUM CHLORIDE, SODIUM LACTATE, POTASSIUM CHLORIDE, AND CALCIUM CHLORIDE 1000 ML: .6; .31; .03; .02 INJECTION, SOLUTION INTRAVENOUS at 22:16

## 2025-04-02 RX ADMIN — MECLIZINE HYDROCHLORIDE 50 MG: 25 TABLET, CHEWABLE ORAL at 23:34

## 2025-04-02 RX ADMIN — ONDANSETRON 4 MG: 2 INJECTION, SOLUTION INTRAMUSCULAR; INTRAVENOUS at 22:16

## 2025-04-02 RX ADMIN — IOPAMIDOL 75 ML: 755 INJECTION, SOLUTION INTRAVENOUS at 23:05

## 2025-04-02 ASSESSMENT — PAIN SCALES - GENERAL: PAINLEVEL_OUTOF10: 2

## 2025-04-02 ASSESSMENT — PAIN DESCRIPTION - DESCRIPTORS: DESCRIPTORS: ACHING

## 2025-04-02 ASSESSMENT — LIFESTYLE VARIABLES
HOW OFTEN DO YOU HAVE A DRINK CONTAINING ALCOHOL: NEVER
HOW MANY STANDARD DRINKS CONTAINING ALCOHOL DO YOU HAVE ON A TYPICAL DAY: PATIENT DOES NOT DRINK

## 2025-04-02 ASSESSMENT — PAIN DESCRIPTION - LOCATION: LOCATION: BACK

## 2025-04-02 ASSESSMENT — PAIN DESCRIPTION - ORIENTATION: ORIENTATION: MID

## 2025-04-03 VITALS
BODY MASS INDEX: 31.02 KG/M2 | WEIGHT: 193 LBS | SYSTOLIC BLOOD PRESSURE: 160 MMHG | TEMPERATURE: 98.2 F | DIASTOLIC BLOOD PRESSURE: 79 MMHG | HEIGHT: 66 IN | OXYGEN SATURATION: 98 % | RESPIRATION RATE: 14 BRPM | HEART RATE: 59 BPM

## 2025-04-03 LAB
EKG ATRIAL RATE: 64 BPM
EKG DIAGNOSIS: NORMAL
EKG P AXIS: 63 DEGREES
EKG P-R INTERVAL: 150 MS
EKG Q-T INTERVAL: 418 MS
EKG QRS DURATION: 72 MS
EKG QTC CALCULATION (BAZETT): 431 MS
EKG R AXIS: 0 DEGREES
EKG T AXIS: 56 DEGREES
EKG VENTRICULAR RATE: 64 BPM

## 2025-04-03 PROCEDURE — 93010 ELECTROCARDIOGRAM REPORT: CPT | Performed by: INTERNAL MEDICINE

## 2025-04-03 RX ORDER — MECLIZINE HYDROCHLORIDE 25 MG/1
25 TABLET ORAL 3 TIMES DAILY PRN
Qty: 30 TABLET | Refills: 0 | Status: SHIPPED | OUTPATIENT
Start: 2025-04-03 | End: 2025-04-13

## 2025-04-03 ASSESSMENT — PAIN - FUNCTIONAL ASSESSMENT: PAIN_FUNCTIONAL_ASSESSMENT: NONE - DENIES PAIN

## 2025-04-03 NOTE — DISCHARGE INSTRUCTIONS
Return to the nearest ED if you develop severe worsening dizziness, nausea and vomiting, new numbness/tingling, or other concerning symptoms.

## 2025-04-03 NOTE — ED NOTES
Pt sitting up in bed eyes open, AAOx4, NAD, resp e/u on RA, bed locked lowest position, rails up x2, call light within reach, on BP cuff Pulse ox and Cardiac monitor, states the past few days has been in AFIB (hx of AFIB) but that this morning getting out of bed felt herself go back into a normal rhythm and had dizziness ever since and some mild back pain. Denies any other complaints or needs at this time, per DO states when standing pt up to check orthostatics pt started to feel increase in dizziness but without significant change in vital signs. Hx Vertigo but states this is \"much worse than I'm used to\".

## 2025-04-03 NOTE — ED NOTES
Deangelo gave MD the EKG report and made MD aware of symptoms.  No new orders at this time.  Maira gongora

## 2025-04-03 NOTE — ED NOTES
AAOx4, NAD, resp e/u on RA, states feels significantly better since med administration, d/c home with family.

## 2025-04-03 NOTE — ED PROVIDER NOTES
[facility] DEPARTMENT      CHIEF COMPLAINT  Irregular Heart Beat (Pt with hx of afib, on blood thinners but states her heart has been \"acting funny\" past two days.  Pt states heart rhythm went back to normal around 10 but then the dizziness and nausea started. )       HISTORY OF PRESENT ILLNESS  Mague Tomlin is a 84 y.o. female  who presents to the ED complaining of dizziness.  Patient states that for the last 2 days she felt like she has been in A-fib with palpitations and fatigue.  States that this morning her rhythm appeared to go back to normal, however that is when she started feeling dizzy and nauseous.  Note that she gets very lightheaded and also developed a room spin sensation upon standing.  States that she has been eating and drinking normally recently.  Denies any diarrhea, vomiting, fevers, chills, chest pain.  Does endorse and shortness of breath.    No other complaints, modifying factors or associated symptoms.     I have reviewed the following from the nursing documentation.    Past Medical History:   Diagnosis Date    Atrial fibrillation (HCC)     BPPV (benign paroxysmal positional vertigo), left     Depression     Hypertension      Past Surgical History:   Procedure Laterality Date    ABLATION OF DYSRHYTHMIC FOCUS      CHOLECYSTECTOMY      COLONOSCOPY N/A 3/26/2021    COLONOSCOPY POLYPECTOMY ABLATION performed by Damon Cornejo MD at [facility] ENDOSCOPY    HYSTERECTOMY (CERVIX STATUS UNKNOWN)      JOINT REPLACEMENT      right knee    UPPER GASTROINTESTINAL ENDOSCOPY N/A 3/26/2021    EGD BIOPSY performed by Damon Cornejo MD at [facility] ENDOSCOPY     Family History   Problem Relation Age of Onset    No Known Problems Mother     No Known Problems Father     Cancer Brother      Social History     Socioeconomic History    Marital status:      Spouse name: Not on file    Number of children: Not on file    Years of education: Not on file    Highest education level: Not on file   Occupational

## (undated) DEVICE — SINGLE-USE POLYPECTOMY SNARE: Brand: CAPTIVATOR

## (undated) DEVICE — FORCEPS BX L240CM WRK CHN 2.8MM STD CAP W/ NDL MIC MESH

## (undated) DEVICE — ERBE NESSY® OMEGA PLATE USA (85+23)CM² , WITH CABLE 3 M: Brand: ERBE

## (undated) DEVICE — TRAP SPEC RETRV CLR PLAS POLYP IN LN SUCT QUIK CTCH

## (undated) DEVICE — CLIPPING DEVICE: Brand: RESOLUTION CLIP

## (undated) DEVICE — MASK CAPNOGRAPHY AD W35IN DIA58IN SAMP LN L10FT O2 LN